# Patient Record
Sex: FEMALE | Race: WHITE | Employment: FULL TIME | URBAN - METROPOLITAN AREA
[De-identification: names, ages, dates, MRNs, and addresses within clinical notes are randomized per-mention and may not be internally consistent; named-entity substitution may affect disease eponyms.]

---

## 2017-06-07 ENCOUNTER — OFFICE VISIT (OUTPATIENT)
Dept: INTERNAL MEDICINE CLINIC | Age: 32
End: 2017-06-07

## 2017-06-07 VITALS
DIASTOLIC BLOOD PRESSURE: 76 MMHG | HEART RATE: 80 BPM | WEIGHT: 249 LBS | RESPIRATION RATE: 16 BRPM | BODY MASS INDEX: 41.48 KG/M2 | SYSTOLIC BLOOD PRESSURE: 122 MMHG | HEIGHT: 65 IN

## 2017-06-07 DIAGNOSIS — F32.A DEPRESSION, UNSPECIFIED DEPRESSION TYPE: ICD-10-CM

## 2017-06-07 DIAGNOSIS — Z00.00 PREVENTATIVE HEALTH CARE: ICD-10-CM

## 2017-06-07 DIAGNOSIS — Z00.00 PREVENTATIVE HEALTH CARE: Primary | ICD-10-CM

## 2017-06-07 LAB
A/G RATIO: 1.2 (ref 1.1–2.2)
ALBUMIN SERPL-MCNC: 3.9 G/DL (ref 3.4–5)
ALP BLD-CCNC: 89 U/L (ref 40–129)
ALT SERPL-CCNC: 24 U/L (ref 10–40)
ANION GAP SERPL CALCULATED.3IONS-SCNC: 14 MMOL/L (ref 3–16)
AST SERPL-CCNC: 18 U/L (ref 15–37)
BILIRUB SERPL-MCNC: 0.4 MG/DL (ref 0–1)
BUN BLDV-MCNC: 15 MG/DL (ref 7–20)
CALCIUM SERPL-MCNC: 8.9 MG/DL (ref 8.3–10.6)
CHLORIDE BLD-SCNC: 102 MMOL/L (ref 99–110)
CHOLESTEROL, TOTAL: 227 MG/DL (ref 0–199)
CO2: 25 MMOL/L (ref 21–32)
CREAT SERPL-MCNC: 0.8 MG/DL (ref 0.6–1.1)
GFR AFRICAN AMERICAN: >60
GFR NON-AFRICAN AMERICAN: >60
GLOBULIN: 3.3 G/DL
GLUCOSE BLD-MCNC: 79 MG/DL (ref 70–99)
HDLC SERPL-MCNC: 54 MG/DL (ref 40–60)
LDL CHOLESTEROL CALCULATED: 144 MG/DL
POTASSIUM SERPL-SCNC: 4.7 MMOL/L (ref 3.5–5.1)
SODIUM BLD-SCNC: 141 MMOL/L (ref 136–145)
TOTAL PROTEIN: 7.2 G/DL (ref 6.4–8.2)
TRIGL SERPL-MCNC: 144 MG/DL (ref 0–150)
VLDLC SERPL CALC-MCNC: 29 MG/DL

## 2017-06-07 PROCEDURE — 99395 PREV VISIT EST AGE 18-39: CPT | Performed by: INTERNAL MEDICINE

## 2017-06-07 RX ORDER — VALACYCLOVIR HYDROCHLORIDE 1 G/1
TABLET, FILM COATED ORAL
Qty: 30 TABLET | Refills: 11 | Status: SHIPPED | OUTPATIENT
Start: 2017-06-07 | End: 2018-06-29 | Stop reason: SDUPTHER

## 2017-06-07 RX ORDER — BUPROPION HYDROCHLORIDE 300 MG/1
TABLET ORAL
Qty: 30 TABLET | Refills: 11 | Status: SHIPPED | OUTPATIENT
Start: 2017-06-07 | End: 2018-06-29 | Stop reason: SDUPTHER

## 2017-06-07 ASSESSMENT — PATIENT HEALTH QUESTIONNAIRE - PHQ9
1. LITTLE INTEREST OR PLEASURE IN DOING THINGS: 0
SUM OF ALL RESPONSES TO PHQ QUESTIONS 1-9: 0
2. FEELING DOWN, DEPRESSED OR HOPELESS: 0
SUM OF ALL RESPONSES TO PHQ9 QUESTIONS 1 & 2: 0

## 2017-09-12 ENCOUNTER — NURSE ONLY (OUTPATIENT)
Dept: INTERNAL MEDICINE CLINIC | Age: 32
End: 2017-09-12

## 2017-09-12 DIAGNOSIS — Z23 FLU VACCINE NEED: Primary | ICD-10-CM

## 2017-09-12 PROCEDURE — 99999 PR OFFICE/OUTPT VISIT,PROCEDURE ONLY: CPT | Performed by: INTERNAL MEDICINE

## 2017-09-12 PROCEDURE — 90686 IIV4 VACC NO PRSV 0.5 ML IM: CPT | Performed by: INTERNAL MEDICINE

## 2017-09-12 PROCEDURE — 90471 IMMUNIZATION ADMIN: CPT | Performed by: INTERNAL MEDICINE

## 2018-06-29 DIAGNOSIS — Z00.00 PREVENTATIVE HEALTH CARE: ICD-10-CM

## 2018-06-29 DIAGNOSIS — F32.A DEPRESSION, UNSPECIFIED DEPRESSION TYPE: ICD-10-CM

## 2018-06-29 RX ORDER — BUPROPION HYDROCHLORIDE 300 MG/1
TABLET ORAL
Qty: 30 TABLET | Refills: 10 | Status: SHIPPED | OUTPATIENT
Start: 2018-06-29 | End: 2018-07-02 | Stop reason: SDUPTHER

## 2018-06-29 RX ORDER — VALACYCLOVIR HYDROCHLORIDE 1 G/1
TABLET, FILM COATED ORAL
Qty: 30 TABLET | Refills: 10 | Status: SHIPPED | OUTPATIENT
Start: 2018-06-29 | End: 2018-07-02 | Stop reason: SDUPTHER

## 2018-07-02 DIAGNOSIS — Z00.00 PREVENTATIVE HEALTH CARE: ICD-10-CM

## 2018-07-02 DIAGNOSIS — F32.A DEPRESSION, UNSPECIFIED DEPRESSION TYPE: ICD-10-CM

## 2018-07-02 RX ORDER — VALACYCLOVIR HYDROCHLORIDE 1 G/1
1000 TABLET, FILM COATED ORAL DAILY
Qty: 30 TABLET | Refills: 11 | Status: SHIPPED | OUTPATIENT
Start: 2018-07-02 | End: 2019-07-22 | Stop reason: SDUPTHER

## 2018-07-02 RX ORDER — BUPROPION HYDROCHLORIDE 300 MG/1
300 TABLET ORAL EVERY MORNING
Qty: 30 TABLET | Refills: 11 | Status: SHIPPED | OUTPATIENT
Start: 2018-07-02 | End: 2019-07-22 | Stop reason: SDUPTHER

## 2018-08-13 ENCOUNTER — OFFICE VISIT (OUTPATIENT)
Dept: INTERNAL MEDICINE CLINIC | Age: 33
End: 2018-08-13

## 2018-08-13 VITALS
SYSTOLIC BLOOD PRESSURE: 118 MMHG | HEIGHT: 65 IN | BODY MASS INDEX: 37.72 KG/M2 | DIASTOLIC BLOOD PRESSURE: 74 MMHG | HEART RATE: 86 BPM | RESPIRATION RATE: 18 BRPM | OXYGEN SATURATION: 97 % | WEIGHT: 226.4 LBS

## 2018-08-13 DIAGNOSIS — B00.1 COLD SORE: ICD-10-CM

## 2018-08-13 DIAGNOSIS — E78.00 HYPERCHOLESTEREMIA: ICD-10-CM

## 2018-08-13 DIAGNOSIS — Z00.00 ENCOUNTER FOR PREVENTIVE CARE: ICD-10-CM

## 2018-08-13 DIAGNOSIS — Z00.00 ENCOUNTER FOR PREVENTIVE CARE: Primary | ICD-10-CM

## 2018-08-13 LAB
A/G RATIO: 1.5 (ref 1.1–2.2)
ALBUMIN SERPL-MCNC: 4.1 G/DL (ref 3.4–5)
ALP BLD-CCNC: 70 U/L (ref 40–129)
ALT SERPL-CCNC: 21 U/L (ref 10–40)
ANION GAP SERPL CALCULATED.3IONS-SCNC: 10 MMOL/L (ref 3–16)
AST SERPL-CCNC: 18 U/L (ref 15–37)
BILIRUB SERPL-MCNC: <0.2 MG/DL (ref 0–1)
BUN BLDV-MCNC: 11 MG/DL (ref 7–20)
CALCIUM SERPL-MCNC: 9 MG/DL (ref 8.3–10.6)
CHLORIDE BLD-SCNC: 102 MMOL/L (ref 99–110)
CHOLESTEROL, TOTAL: 216 MG/DL (ref 0–199)
CO2: 27 MMOL/L (ref 21–32)
CREAT SERPL-MCNC: 0.8 MG/DL (ref 0.6–1.1)
GFR AFRICAN AMERICAN: >60
GFR NON-AFRICAN AMERICAN: >60
GLOBULIN: 2.8 G/DL
GLUCOSE BLD-MCNC: 92 MG/DL (ref 70–99)
HDLC SERPL-MCNC: 57 MG/DL (ref 40–60)
LDL CHOLESTEROL CALCULATED: 146 MG/DL
POTASSIUM SERPL-SCNC: 5 MMOL/L (ref 3.5–5.1)
SODIUM BLD-SCNC: 139 MMOL/L (ref 136–145)
TOTAL PROTEIN: 6.9 G/DL (ref 6.4–8.2)
TRIGL SERPL-MCNC: 65 MG/DL (ref 0–150)
VLDLC SERPL CALC-MCNC: 13 MG/DL

## 2018-08-13 PROCEDURE — 99395 PREV VISIT EST AGE 18-39: CPT | Performed by: INTERNAL MEDICINE

## 2018-08-13 ASSESSMENT — PATIENT HEALTH QUESTIONNAIRE - PHQ9
SUM OF ALL RESPONSES TO PHQ QUESTIONS 1-9: 0
2. FEELING DOWN, DEPRESSED OR HOPELESS: 0
SUM OF ALL RESPONSES TO PHQ QUESTIONS 1-9: 0
1. LITTLE INTEREST OR PLEASURE IN DOING THINGS: 0
SUM OF ALL RESPONSES TO PHQ9 QUESTIONS 1 & 2: 0

## 2019-02-11 RX ORDER — OSELTAMIVIR PHOSPHATE 75 MG/1
75 CAPSULE ORAL DAILY
Qty: 10 CAPSULE | Refills: 0 | Status: SHIPPED | OUTPATIENT
Start: 2019-02-11 | End: 2019-02-21

## 2019-03-20 ENCOUNTER — OFFICE VISIT (OUTPATIENT)
Dept: INTERNAL MEDICINE CLINIC | Age: 34
End: 2019-03-20
Payer: COMMERCIAL

## 2019-03-20 VITALS
HEART RATE: 105 BPM | RESPIRATION RATE: 18 BRPM | SYSTOLIC BLOOD PRESSURE: 116 MMHG | OXYGEN SATURATION: 97 % | DIASTOLIC BLOOD PRESSURE: 70 MMHG

## 2019-03-20 DIAGNOSIS — E66.9 CLASS 2 OBESITY WITHOUT SERIOUS COMORBIDITY WITH BODY MASS INDEX (BMI) OF 37.0 TO 37.9 IN ADULT, UNSPECIFIED OBESITY TYPE: ICD-10-CM

## 2019-03-20 DIAGNOSIS — M25.561 ACUTE PAIN OF RIGHT KNEE: Primary | ICD-10-CM

## 2019-03-20 PROCEDURE — 1036F TOBACCO NON-USER: CPT | Performed by: INTERNAL MEDICINE

## 2019-03-20 PROCEDURE — G8484 FLU IMMUNIZE NO ADMIN: HCPCS | Performed by: INTERNAL MEDICINE

## 2019-03-20 PROCEDURE — G8417 CALC BMI ABV UP PARAM F/U: HCPCS | Performed by: INTERNAL MEDICINE

## 2019-03-20 PROCEDURE — G8427 DOCREV CUR MEDS BY ELIG CLIN: HCPCS | Performed by: INTERNAL MEDICINE

## 2019-03-20 PROCEDURE — 99213 OFFICE O/P EST LOW 20 MIN: CPT | Performed by: INTERNAL MEDICINE

## 2019-03-20 RX ORDER — NAPROXEN 500 MG/1
500 TABLET ORAL 2 TIMES DAILY WITH MEALS
Qty: 30 TABLET | Refills: 0 | Status: SHIPPED | OUTPATIENT
Start: 2019-03-20 | End: 2019-09-27 | Stop reason: ALTCHOICE

## 2019-03-20 ASSESSMENT — PATIENT HEALTH QUESTIONNAIRE - PHQ9
SUM OF ALL RESPONSES TO PHQ QUESTIONS 1-9: 1
SUM OF ALL RESPONSES TO PHQ9 QUESTIONS 1 & 2: 1
1. LITTLE INTEREST OR PLEASURE IN DOING THINGS: 1
SUM OF ALL RESPONSES TO PHQ QUESTIONS 1-9: 1
2. FEELING DOWN, DEPRESSED OR HOPELESS: 0

## 2019-03-27 ENCOUNTER — TELEPHONE (OUTPATIENT)
Dept: BARIATRICS/WEIGHT MGMT | Age: 34
End: 2019-03-27

## 2019-04-01 ENCOUNTER — TELEPHONE (OUTPATIENT)
Dept: INTERNAL MEDICINE CLINIC | Age: 34
End: 2019-04-01

## 2019-04-01 DIAGNOSIS — M25.561 ACUTE PAIN OF RIGHT KNEE: Primary | ICD-10-CM

## 2019-04-08 ENCOUNTER — HOSPITAL ENCOUNTER (OUTPATIENT)
Dept: MRI IMAGING | Age: 34
Discharge: HOME | End: 2019-04-08
Payer: COMMERCIAL

## 2019-04-08 DIAGNOSIS — M25.561 ACUTE PAIN OF RIGHT KNEE: ICD-10-CM

## 2019-04-08 PROCEDURE — 73721 MRI JNT OF LWR EXTRE W/O DYE: CPT

## 2019-04-09 ENCOUNTER — HOSPITAL ENCOUNTER (OUTPATIENT)
Age: 34
Discharge: HOME | End: 2019-04-09
Payer: COMMERCIAL

## 2019-04-09 ENCOUNTER — OFFICE VISIT (OUTPATIENT)
Dept: BARIATRICS/WEIGHT MGMT | Age: 34
End: 2019-04-09
Payer: COMMERCIAL

## 2019-04-09 VITALS
WEIGHT: 228.8 LBS | BODY MASS INDEX: 38.12 KG/M2 | HEART RATE: 73 BPM | SYSTOLIC BLOOD PRESSURE: 126 MMHG | HEIGHT: 65 IN | RESPIRATION RATE: 20 BRPM | DIASTOLIC BLOOD PRESSURE: 68 MMHG

## 2019-04-09 DIAGNOSIS — E66.01 MORBID OBESITY DUE TO EXCESS CALORIES (HCC): ICD-10-CM

## 2019-04-09 DIAGNOSIS — E78.5 HYPERLIPIDEMIA, UNSPECIFIED HYPERLIPIDEMIA TYPE: Primary | ICD-10-CM

## 2019-04-09 LAB
FOLATE: 11.7 NG/ML (ref 3.1–17.5)
INTERPRETATION: NORMAL
PREGNANCY, URINE: NEGATIVE
SPECIFIC GRAVITY, URINE: 1 (ref 1–1.03)
VITAMIN B-12: 578.8 PG/ML (ref 211–911)
VITAMIN D 25-HYDROXY: 29.89 NG/ML

## 2019-04-09 PROCEDURE — 82306 VITAMIN D 25 HYDROXY: CPT

## 2019-04-09 PROCEDURE — 99204 OFFICE O/P NEW MOD 45 MIN: CPT | Performed by: NURSE PRACTITIONER

## 2019-04-09 PROCEDURE — 81025 URINE PREGNANCY TEST: CPT

## 2019-04-09 PROCEDURE — 82746 ASSAY OF FOLIC ACID SERUM: CPT

## 2019-04-09 PROCEDURE — 36415 COLL VENOUS BLD VENIPUNCTURE: CPT

## 2019-04-09 PROCEDURE — G8417 CALC BMI ABV UP PARAM F/U: HCPCS | Performed by: NURSE PRACTITIONER

## 2019-04-09 PROCEDURE — 1036F TOBACCO NON-USER: CPT | Performed by: NURSE PRACTITIONER

## 2019-04-09 PROCEDURE — G8427 DOCREV CUR MEDS BY ELIG CLIN: HCPCS | Performed by: NURSE PRACTITIONER

## 2019-04-09 PROCEDURE — 82607 VITAMIN B-12: CPT

## 2019-04-09 RX ORDER — VALACYCLOVIR HYDROCHLORIDE 1 G/1
1000 TABLET, FILM COATED ORAL
COMMUNITY
End: 2019-04-09

## 2019-04-09 RX ORDER — BUPROPION HYDROCHLORIDE 150 MG/1
150 TABLET, EXTENDED RELEASE ORAL
COMMUNITY
End: 2019-04-09

## 2019-04-09 SDOH — HEALTH STABILITY: MENTAL HEALTH: HOW OFTEN DO YOU HAVE A DRINK CONTAINING ALCOHOL?: MONTHLY OR LESS

## 2019-04-09 NOTE — PROGRESS NOTES
Patient dines out to a sit down restaurant 4 times per month. Patient eats fast food meals 1- 2 times per week. Drinks mostly coffee    24 hour recall/food frequency chart:  Breakfast: A quest protein cookie  Snack: no  Lunch: Homemade jalapeno poppers   Snack: no  Dinner: Salad @ [employer]  Snack: no    Total daily calories: Count carbs not calories tries to stay under 30 per day. Exercises Cardio, weights, soccer practice 30 min 4  Times per week.

## 2019-04-09 NOTE — PROGRESS NOTES
Subjective     Chief Complaint   Patient presents with    New Patient     New Medication Consultation       Vitals:    04/09/19 0945   BP: 126/68   Pulse: 73   Resp: 20     Wt Readings from Last 3 Encounters:   04/09/19 228 lb 12.8 oz (103.8 kg)   08/13/18 226 lb 6.4 oz (102.7 kg)   06/07/17 249 lb (112.9 kg)       Weight Loss Program History:  The patient is a 27 y.o. female being seen regarding our weight loss program.  The patient's PCP is Dr. Marjan Acevedo and presents on PCP referral.  The patient first recognized that she had a weight problem about 15 years ago. The patient's lowest weight in the last five years was 200 lbs, and the highest weight in the last five years was 255 lbs. Ninoska's current Body mass index is 38.07 kg/m². (4/9/19). HPI:  The patient states she has tried medications and low carb. These attempts have been short term successful with weight loss, but have failed to sustain adequate weight loss. Patient was on adipex around 5 years ago, did well but gained weight back. She also had some tachycardia. Has also been on contrave, cannot remember if tolerated. Also attempted Rolando,did not tolerate. Hx of depression, decently controlled on Wellbutrin seasonal worse. No hx of SI. No hx of drug use or alcohol use. No hx of tachycardia other than on Adipex. Hyperlipidemia, not on medication. Borderline. Has been doing low carb in attempts to lose weight. Ninoska's life is affected by weight related to below comorbidity. The patient has also tried self directed diet and exercise in attempts to sustain weight loss. Comorbid Conditions:  Significant diseases effecting this patient are   Past Medical History:   Diagnosis Date    CCC (chronic calculus cholecystitis) 1/31/2012    Depression     Hyperlipidemia     Recurrent cold sores     On Valtrex One Gram Daily For This   .   Thoroughly reviewed the patient's medical history, family history, social history and review of systems with the patient today in the office. Please see medical record for pertinent positives. Allergies:  No Known Allergies    Past Surgical History:  Past Surgical History:   Procedure Laterality Date    CHOLECYSTECTOMY, LAPAROSCOPIC  01/31/2012    WISDOM TOOTH EXTRACTION  2002    \"4\"       Family History:  Family History   Problem Relation Age of Onset    Cancer Mother         \"Multiple Myleoma\"    Arthritis Mother     Depression Mother     High Cholesterol Mother    Marinette Calli / [place] Mother         X1    Stroke Mother         \"TIA'S\"    Mental Illness Mother         \"Dementia\"    Other Mother         \"Thyroid\", \"Glaucoma\"    Mental Illness Father         \"Dementia\"    Heart Disease Father         \"Open Heart For Valves\"    High Cholesterol Father     Other Father         \"Multiple Sclerosis\", \"Thyroid\", \"Glaucoma\"       Social History:  Social History     Socioeconomic History    Marital status:      Spouse name: Not on file    Number of children: 1    Years of education: bachelors degree     Highest education level: Not on file   Occupational History    Occupation:     Social Needs    Financial resource strain: Not on file    Food insecurity:     Worry: Not on file     Inability: Not on file    Transportation needs:     Medical: Not on file     Non-medical: Not on file   Tobacco Use    Smoking status: Never Smoker    Smokeless tobacco: Never Used   Substance and Sexual Activity    Alcohol use:  Yes     Alcohol/week: 0.6 oz     Types: 1 Standard drinks or equivalent per week     Frequency: Monthly or less     Comment: \"Socially\"    Drug use: No    Sexual activity: Yes     Partners: Male     Comment:  had vasectomy   Lifestyle    Physical activity:     Days per week: Not on file     Minutes per session: Not on file    Stress: Not on file   Relationships    Social connections:     Talks on phone: Not on file     Gets together: Not on file Attends Muslim service: Not on file     Active member of club or organization: Not on file     Attends meetings of clubs or organizations: Not on file     Relationship status: Not on file    Intimate partner violence:     Fear of current or ex partner: Not on file     Emotionally abused: Not on file     Physically abused: Not on file     Forced sexual activity: Not on file   Other Topics Concern    Not on file   Social History Narrative        Exercise - beach body programs - high intensity interval training 3 times weekly    Primary caregiver for parents    Seat belts - always       Review of Systems - History obtained from the patient. Review of Systems - General ROS: negative for - chills, fever, hot flashes or night sweats  ENT ROS: negative for - headaches, oral lesions, sore throat, vertigo or visual changes  Allergy and Immunology ROS: negative for - hives or nasal congestion  Endocrine ROS: negative for - hair pattern changes, mood swings or polydipsia/polyuria  Respiratory ROS: no cough, shortness of breath, or wheezing  Cardiovascular ROS: no chest pain or dyspnea on exertion  Gastrointestinal ROS: no abdominal pain, change in bowel habits, or black or bloody stools  Genito-Urinary ROS: no dysuria, incontinence, trouble voiding, or hematuria  Musculoskeletal ROS: Negative for joint pain or myalgia  Neurological ROS: negative for - behavioral changes, dizziness, headaches, impaired coordination/balance, numbness/tingling or seizures  Dermatological ROS: negative for - eczema or nail changes  Psychological ROS: negative for anxiety, +depression but denies any hx of suicidal ideation     Objective     Physical Exam   Constitutional: Oriented to person, place, and time and well-developed, well-nourished, and in no distress. No distress. Obese   HENT:   Head: Normocephalic and atraumatic. Eyes: Conjunctivae are normal. Pupils are equal, round, and reactive to light.    Neck: Normal range of motion. Neck supple. Cardiovascular: Normal rate, regular rhythm, normal heart sounds and intact distal pulses. No murmur heard. Pulmonary/Chest: Effort normal and breath sounds normal. No respiratory distress. Abdominal: Soft. Bowel sounds are normal. Exhibits no distension. There is no tenderness. Musculoskeletal: Exhibits no edema or tenderness. Lymphadenopathy: Deferred. Neurological: She is alert and oriented to person, place, and time. No cranial nerve deficit. Skin: Skin is warm. She is not diaphoretic. Psychiatric: Mood, memory, affect and judgment normal.     Assessment  and Plan      Adan Butts is a good candidate for Saxenda/Liraglutide weight loss medication, along with nutrition consult and detailed diet plan. Patient here today to discuss candidacy for weight loss medication: BMI of >30 with co-morbidities. Patient has failed to lose 5% of body weight for 15 years. Dose: Initial 0.6 mg SUBQ. Increase weekly 1.2, 1.8, and 2.4 mg until recommend dose of 3 mg daily. Re-evaluate after 16 weeks. Discussed side effects in detail, most common but not limited to; diarrhea, constipation, hypoglycemia (most common if used in conjunction with DM medications), increase lipase, increase heart rate. Aware to call with ANY side effects. Will meet monthly with registered dietician for weigh in's and education on specific weight loss diet plan. Patient was educated today on increasing protein to >60 grams per day, hydration to over 60 ounces per day, and counting calories (0355-0289) per day. Will monitor weight and vital signs bi-weekly. Patient informed of importance and compliance with diet plan. initial labs and urine pregnancy if applicable. Patient also needs current birth control measures  vasectomy.     Weight GAIN medications to consider; MAOIs tricyclic antidepressants: A lot of antipsychotics, prednisone, hormonal agents especially progestins and medroxyprogesterone. Wellbutrin good options for depression, weight loss. Do not recommend adipex given hx of tachycardia, no contrave or belviq, given Wellbutrin use. Plan    1. Hyperlipidemia, unspecified hyperlipidemia type  - Patient has been working on weight loss, will recheck labs and needs urine preg. - Discussed higher lean protein foods and carb. - PREGNANCY, URINE; Future  - Vitamin B12 & Folate; Future  - Vitamin D 25 Hydroxy; Future  - Liraglutide (VICTOZA) 18 MG/3ML SOPN SC injection; Inject 0.6 mg into the skin daily 0.6 x1 wk, 1.2 wk 2, 1.8 wk 3, 2.4 wk 4, 3 mg wk 5 on. BMI 38. Dispense: 2 pen; Refill: 3    2. Morbid obesity due to excess calories [facility])  - Patient here for weight loss consult. - Discussed working towards BMI <30.   - Discussed medication and watched video tutorial.   - Liraglutide (VICTOZA) 18 MG/3ML SOPN SC injection; Inject 0.6 mg into the skin daily 0.6 x1 wk, 1.2 wk 2, 1.8 wk 3, 2.4 wk 4, 3 mg wk 5 on. BMI 38. Dispense: 2 pen; Refill: 3    - Discussed weight loss program with patient and the metabolic components of obesity and insulin resistance over time. - Ultimately will need to change overall eating behaviors to have success in continued management with weight loss. - Will continue to journal food items and discussed the below recommendations to patient. - Encouraged her to weigh daily and work towards a goal of 1-2 pounds weekly. - She is aware that she may schedule at anytime with dietitian or with NP.  - All questions answered and overall appears very receptive.   - Weekly weight and vital checks when starting medication.   - Follow up for new patient group class and RTC 1 dejuan with NP. Patient was encouraged to journal all food intake using [employer] pal. Water drinking was encouraged with a goal of 64oz-128oz daily. Beverages to be calorie free except for milk. Every other beverage should be water. They are to avoid soda.  Continue to increase level of physical activity. I spent 45 minutes with patient today and more than 50% of the office visit today was spent in face to face counseling regarding diet and exercise, counting calories, medication video, complying with the diet recommendations, and complying with the work up of dietary counseling and exercise physiologist counseling. The patient expressed understanding and willingness to comply nicely; all questions and concerns addressed in details. Patient counseled on the benefits of treatment plan at length while in the office today. Patient states an understanding and willingness to proceed with the recommended weight loss plan. Orders Placed This Encounter   Medications    Liraglutide (VICTOZA) 18 MG/3ML SOPN SC injection     Sig: Inject 0.6 mg into the skin daily 0.6 x1 wk, 1.2 wk 2, 1.8 wk 3, 2.4 wk 4, 3 mg wk 5 on. BMI 38. Dispense:  2 pen     Refill:  3     Orders Placed This Encounter   Procedures    PREGNANCY, URINE     Standing Status:   Future     Standing Expiration Date:   4/9/2020    Vitamin B12 & Folate     Standing Status:   Future     Standing Expiration Date:   4/8/2020    Vitamin D 25 Hydroxy     Standing Status:   Future     Standing Expiration Date:   4/9/2020       Follow Up:  No follow-ups on file.     Morena Gomes, CNP

## 2019-04-17 ENCOUNTER — OFFICE VISIT (OUTPATIENT)
Dept: BARIATRICS/WEIGHT MGMT | Age: 34
End: 2019-04-17

## 2019-04-17 VITALS
SYSTOLIC BLOOD PRESSURE: 114 MMHG | HEIGHT: 65 IN | DIASTOLIC BLOOD PRESSURE: 65 MMHG | WEIGHT: 229 LBS | BODY MASS INDEX: 38.15 KG/M2 | HEART RATE: 77 BPM

## 2019-04-17 DIAGNOSIS — E66.9 OBESITY (BMI 30-39.9): Primary | ICD-10-CM

## 2019-04-17 PROCEDURE — 99999 PR OFFICE/OUTPT VISIT,PROCEDURE ONLY: CPT

## 2019-04-17 NOTE — PROGRESS NOTES
children: 1    Years of education: bachelors degree     Highest education level: Not on file   Occupational History    Occupation:     Social Needs    Financial resource strain: Not on file    Food insecurity:     Worry: Not on file     Inability: Not on file    Transportation needs:     Medical: Not on file     Non-medical: Not on file   Tobacco Use    Smoking status: Never Smoker    Smokeless tobacco: Never Used   Substance and Sexual Activity    Alcohol use:  Yes     Alcohol/week: 0.6 oz     Types: 1 Standard drinks or equivalent per week     Frequency: Monthly or less     Comment: \"Socially\"    Drug use: No    Sexual activity: Yes     Partners: Male     Comment:  had vasectomy   Lifestyle    Physical activity:     Days per week: Not on file     Minutes per session: Not on file    Stress: Not on file   Relationships    Social connections:     Talks on phone: Not on file     Gets together: Not on file     Attends Rastafarian service: Not on file     Active member of club or organization: Not on file     Attends meetings of clubs or organizations: Not on file     Relationship status: Not on file    Intimate partner violence:     Fear of current or ex partner: Not on file     Emotionally abused: Not on file     Physically abused: Not on file     Forced sexual activity: Not on file   Other Topics Concern    Not on file   Social History Narrative        Exercise - beach body programs - high intensity interval training 3 times weekly    Primary caregiver for parents    Seat belts - always         OBJECTIVE:  Physical Exam   /65 (Site: Right Upper Arm, Position: Sitting, Cuff Size: Large Adult)   Pulse 77   Ht 5' 5\" (1.651 m)   Wt 229 lb (103.9 kg)   LMP 04/07/2019 (Exact Date)   BMI 38.11 kg/m²        NUTRITION DIAGNOSIS: Overweight / Obesity   Problem: Increased adiposity compared to reference standard orestablished norm   Etiology: Excess intake compared to output over time   S/S: Ht: 65\" Wt: 229 lbs BMI: 38.11    NUTRITION INTERVENTIONS:    Individualized treatment goals to address nutrition diagnosis:   Instructed on Medications and diet for weight loss   Provided handouts   Encouraged Physical activity as approved by physician    MONITORING/ EVALUATION/ PLAN:   Pt verbalized understanding of all materials covered   Pt asked pertinent questions throughout the session - expectcompliance with nutrition guidelines presented   Provided pt with contact information should questions arise prior to next visit   Will f/u with pt weekly  Joon Stewart MS, RDN, LD  4/17/2019

## 2019-04-18 ENCOUNTER — HOSPITAL ENCOUNTER (OUTPATIENT)
Age: 34
Discharge: HOME | End: 2019-04-18
Payer: COMMERCIAL

## 2019-04-18 ENCOUNTER — HOSPITAL ENCOUNTER (OUTPATIENT)
Age: 34
Setting detail: SPECIMEN
Discharge: HOME | End: 2019-04-18
Payer: COMMERCIAL

## 2019-04-18 ENCOUNTER — TELEPHONE (OUTPATIENT)
Dept: BARIATRICS/WEIGHT MGMT | Age: 34
End: 2019-04-18

## 2019-04-18 DIAGNOSIS — E66.01 MORBID OBESITY DUE TO EXCESS CALORIES (HCC): Primary | ICD-10-CM

## 2019-04-18 LAB
AMPHETAMINES: NEGATIVE
BARBITURATE SCREEN URINE: NEGATIVE
BENZODIAZEPINE SCREEN, URINE: NEGATIVE
CANNABINOID SCREEN URINE: NEGATIVE
COCAINE METABOLITE: NEGATIVE
OPIATES, URINE: NEGATIVE
OXYCODONE: NORMAL
PHENCYCLIDINE, URINE: NEGATIVE

## 2019-04-18 PROCEDURE — 93005 ELECTROCARDIOGRAM TRACING: CPT | Performed by: NURSE PRACTITIONER

## 2019-04-18 PROCEDURE — 80307 DRUG TEST PRSMV CHEM ANLYZR: CPT

## 2019-04-19 LAB
EKG ATRIAL RATE: 66 BPM
EKG DIAGNOSIS: NORMAL
EKG P AXIS: 36 DEGREES
EKG P-R INTERVAL: 164 MS
EKG Q-T INTERVAL: 404 MS
EKG QRS DURATION: 80 MS
EKG QTC CALCULATION (BAZETT): 423 MS
EKG R AXIS: 25 DEGREES
EKG T AXIS: 26 DEGREES
EKG VENTRICULAR RATE: 66 BPM

## 2019-04-19 PROCEDURE — 93010 ELECTROCARDIOGRAM REPORT: CPT | Performed by: INTERNAL MEDICINE

## 2019-04-22 ENCOUNTER — OFFICE VISIT (OUTPATIENT)
Dept: BARIATRICS/WEIGHT MGMT | Age: 34
End: 2019-04-22
Payer: COMMERCIAL

## 2019-04-22 VITALS
RESPIRATION RATE: 16 BRPM | HEART RATE: 75 BPM | SYSTOLIC BLOOD PRESSURE: 116 MMHG | BODY MASS INDEX: 38.49 KG/M2 | WEIGHT: 231 LBS | HEIGHT: 65 IN | DIASTOLIC BLOOD PRESSURE: 78 MMHG

## 2019-04-22 DIAGNOSIS — E66.01 MORBID OBESITY DUE TO EXCESS CALORIES (HCC): Primary | ICD-10-CM

## 2019-04-22 PROCEDURE — G8417 CALC BMI ABV UP PARAM F/U: HCPCS | Performed by: NURSE PRACTITIONER

## 2019-04-22 PROCEDURE — 1036F TOBACCO NON-USER: CPT | Performed by: NURSE PRACTITIONER

## 2019-04-22 PROCEDURE — G8427 DOCREV CUR MEDS BY ELIG CLIN: HCPCS | Performed by: NURSE PRACTITIONER

## 2019-04-22 PROCEDURE — 99213 OFFICE O/P EST LOW 20 MIN: CPT | Performed by: NURSE PRACTITIONER

## 2019-04-22 RX ORDER — PHENTERMINE HYDROCHLORIDE 37.5 MG/1
37.5 TABLET ORAL
Qty: 30 TABLET | Refills: 0 | Status: SHIPPED | OUTPATIENT
Start: 2019-04-22 | End: 2019-05-24 | Stop reason: SDUPTHER

## 2019-04-22 ASSESSMENT — ENCOUNTER SYMPTOMS
CHEST TIGHTNESS: 0
BACK PAIN: 1
RHINORRHEA: 0
NAUSEA: 0
DIARRHEA: 0
EYE PAIN: 0
SHORTNESS OF BREATH: 0
ABDOMINAL DISTENTION: 0
ABDOMINAL PAIN: 0
TROUBLE SWALLOWING: 0
WHEEZING: 0

## 2019-05-01 ENCOUNTER — OFFICE VISIT (OUTPATIENT)
Dept: BARIATRICS/WEIGHT MGMT | Age: 34
End: 2019-05-01

## 2019-05-01 VITALS
HEART RATE: 92 BPM | WEIGHT: 225.4 LBS | DIASTOLIC BLOOD PRESSURE: 64 MMHG | SYSTOLIC BLOOD PRESSURE: 126 MMHG | HEIGHT: 65 IN | BODY MASS INDEX: 37.55 KG/M2

## 2019-05-01 DIAGNOSIS — E66.9 OBESITY (BMI 30-39.9): Primary | ICD-10-CM

## 2019-05-01 PROCEDURE — 99999 PR OFFICE/OUTPT VISIT,PROCEDURE ONLY: CPT

## 2019-05-01 NOTE — PROGRESS NOTES
(Exact Date)   BMI 37.51 kg/m²        NUTRITION DIAGNOSIS: Overweight / Obesity   Problem: Increased adiposity compared to reference standard orestablished norm   Etiology: Excess intake compared to output over time   S/S: Ht: 65\" Wt: 225.4 lbs BMI: 37.51    NUTRITION INTERVENTIONS:    Individualized treatment goals to address nutrition diagnosis:   Instructed on 55-65 gm Net Carb diet   Provided sample menus, and meal planning handouts   Encouraged Physical activity as approved by physician    MONITORING/ EVALUATION/ PLAN:   Pt verbalized understanding of all materials covered   Pt asked pertinent questions throughout the session - expectcompliance with nutrition guidelines presented   Provided pt with contact information should questions arise prior to next visit   Will f/u with pt monthly  Loren Way MS, RDN, LD  5/1/2019

## 2019-05-10 ENCOUNTER — NURSE ONLY (OUTPATIENT)
Dept: BARIATRICS/WEIGHT MGMT | Age: 34
End: 2019-05-10

## 2019-05-10 VITALS
HEART RATE: 86 BPM | HEIGHT: 65 IN | SYSTOLIC BLOOD PRESSURE: 118 MMHG | BODY MASS INDEX: 37.17 KG/M2 | DIASTOLIC BLOOD PRESSURE: 76 MMHG | RESPIRATION RATE: 16 BRPM | WEIGHT: 223.1 LBS

## 2019-05-10 DIAGNOSIS — E66.9 OBESITY (BMI 30-39.9): Primary | ICD-10-CM

## 2019-05-15 ENCOUNTER — NURSE ONLY (OUTPATIENT)
Dept: BARIATRICS/WEIGHT MGMT | Age: 34
End: 2019-05-15

## 2019-05-15 VITALS
HEIGHT: 65 IN | SYSTOLIC BLOOD PRESSURE: 124 MMHG | BODY MASS INDEX: 36.84 KG/M2 | DIASTOLIC BLOOD PRESSURE: 66 MMHG | WEIGHT: 221.1 LBS | HEART RATE: 74 BPM

## 2019-05-15 DIAGNOSIS — E66.01 MORBID OBESITY DUE TO EXCESS CALORIES (HCC): Primary | ICD-10-CM

## 2019-05-24 ENCOUNTER — OFFICE VISIT (OUTPATIENT)
Dept: BARIATRICS/WEIGHT MGMT | Age: 34
End: 2019-05-24
Payer: COMMERCIAL

## 2019-05-24 VITALS
SYSTOLIC BLOOD PRESSURE: 118 MMHG | RESPIRATION RATE: 16 BRPM | BODY MASS INDEX: 36.59 KG/M2 | HEIGHT: 65 IN | WEIGHT: 219.6 LBS | HEART RATE: 84 BPM | DIASTOLIC BLOOD PRESSURE: 70 MMHG

## 2019-05-24 DIAGNOSIS — E78.00 HYPERCHOLESTEREMIA: Primary | ICD-10-CM

## 2019-05-24 DIAGNOSIS — E66.01 MORBID OBESITY DUE TO EXCESS CALORIES (HCC): ICD-10-CM

## 2019-05-24 PROCEDURE — 1036F TOBACCO NON-USER: CPT | Performed by: NURSE PRACTITIONER

## 2019-05-24 PROCEDURE — G8417 CALC BMI ABV UP PARAM F/U: HCPCS | Performed by: NURSE PRACTITIONER

## 2019-05-24 PROCEDURE — G8427 DOCREV CUR MEDS BY ELIG CLIN: HCPCS | Performed by: NURSE PRACTITIONER

## 2019-05-24 PROCEDURE — 99213 OFFICE O/P EST LOW 20 MIN: CPT | Performed by: NURSE PRACTITIONER

## 2019-05-24 RX ORDER — PHENTERMINE HYDROCHLORIDE 37.5 MG/1
37.5 TABLET ORAL
Qty: 30 TABLET | Refills: 0 | Status: SHIPPED | OUTPATIENT
Start: 2019-05-24 | End: 2019-06-20 | Stop reason: ALTCHOICE

## 2019-05-24 ASSESSMENT — ENCOUNTER SYMPTOMS
EYE PAIN: 0
ABDOMINAL DISTENTION: 0
RHINORRHEA: 0
NAUSEA: 0
TROUBLE SWALLOWING: 0
ABDOMINAL PAIN: 0
CHEST TIGHTNESS: 0
WHEEZING: 0
DIARRHEA: 0
SHORTNESS OF BREATH: 0

## 2019-05-24 NOTE — PROGRESS NOTES
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:    Chief Complaint   Patient presents with    Weight Management     Adipex rx #2      Past Medical History:   Diagnosis Date    CCC (chronic calculus cholecystitis) 1/31/2012    Depression     Hyperlipidemia     Recurrent cold sores     On Valtrex One Gram Daily For This     Past Surgical History:   Procedure Laterality Date    CHOLECYSTECTOMY, LAPAROSCOPIC  01/31/2012    WISDOM TOOTH EXTRACTION  2002    \"4\"     Current Outpatient Medications   Medication Sig Dispense Refill    phentermine (ADIPEX-P) 37.5 MG tablet Take 1 tablet by mouth every morning (before breakfast) for 30 days. BMI 36 30 tablet 0    naproxen (NAPROSYN) 500 MG tablet Take 1 tablet by mouth 2 times daily (with meals) 30 tablet 0    buPROPion (WELLBUTRIN XL) 300 MG extended release tablet Take 1 tablet by mouth every morning 30 tablet 11    valACYclovir (VALTREX) 1 g tablet Take 1 tablet by mouth daily 30 tablet 11    Norethin Ace-Eth Estrad-FE 1-20 MG-MCG(24) TABS Take by mouth daily       No current facility-administered medications for this visit. Family History   Problem Relation Age of Onset   Mario Alberto Do Cancer Mother         \"Multiple Myleoma\"    Arthritis Mother     Depression Mother     High Cholesterol Mother    Ajay Subramanian / Viktoriya Garrido Mother         X1    Stroke Mother         \"TIA'S\"    Mental Illness Mother         \"Dementia\"    Other Mother         \"Thyroid\", \"Glaucoma\"    Mental Illness Father         \"Dementia\"    Heart Disease Father         \"Open Heart For Valves\"    High Cholesterol Father     Other Father         \"Multiple Sclerosis\", \"Thyroid\", \"Glaucoma\"       HPI  Khris Ferguson presents today for weight loss visit. Has been on adipex x1 month, down -11.4 lbs. No ill side effects reported or mood changes. All weekly nurse vital checks wnl. Meeting with RD monthly. Patient has been calorie counting, staying around 1400 calories per day.  Not going over 50 grams of carbs. Instructed on 55-65 gm Net Carb. Replacing with protein shake/bar daily. Hydration, is adequate but working on it. No calories from drinks. Only doing 1 coffee per day. Patient attempting yard work, no current exercise.  vasectomy. Review of Systems   Constitutional: Negative. Negative for appetite change, fatigue and fever. HENT: Negative for congestion, dental problem, hearing loss, rhinorrhea and trouble swallowing. Eyes: Negative for pain. Respiratory: Negative for chest tightness, shortness of breath and wheezing. Cardiovascular: Negative for chest pain, palpitations and leg swelling. Gastrointestinal: Negative for abdominal distention, abdominal pain, diarrhea and nausea. Endocrine: Negative for cold intolerance and polydipsia. Genitourinary: Negative for difficulty urinating and frequency. Musculoskeletal: Positive for arthralgias. Negative for gait problem. Skin: Negative for rash. Allergic/Immunologic: Negative for environmental allergies. Neurological: Negative for dizziness, seizures and syncope. Hematological: Does not bruise/bleed easily. Psychiatric/Behavioral: Negative for behavioral problems and suicidal ideas. OBJECTIVE:     /70 (Site: Left Upper Arm, Position: Sitting, Cuff Size: Medium Adult)   Pulse 84   Resp 16   Ht 5' 5\" (1.651 m)   Wt 219 lb 9.6 oz (99.6 kg)   LMP 05/07/2019   BMI 36.54 kg/m²   Wt Readings from Last 3 Encounters:   05/24/19 219 lb 9.6 oz (99.6 kg)   05/15/19 221 lb 1.6 oz (100.3 kg)   05/10/19 223 lb 1.6 oz (101.2 kg)       Physical Exam   Constitutional: She is oriented to person, place, and time. She appears well-developed and well-nourished. Obese   HENT:   Head: Normocephalic and atraumatic.    Right Ear: Hearing and ear canal normal.   Left Ear: Hearing and ear canal normal.   Nose: Nose normal.   Mouth/Throat: Uvula is midline and oropharynx is clear and moist.   Eyes: Pupils are equal, round, and reactive to light. Conjunctivae are normal.   Neck: Normal range of motion. Cardiovascular: Normal rate, regular rhythm and normal heart sounds. Pulmonary/Chest: Effort normal and breath sounds normal. She has no decreased breath sounds. She has no wheezes. She has no rhonchi. She has no rales. Abdominal: Soft. Bowel sounds are normal. There is no tenderness. Musculoskeletal: Normal range of motion. She exhibits no tenderness. In all 4 extremities. Neurological: She is alert and oriented to person, place, and time. She has normal strength. She exhibits normal muscle tone. GCS eye subscore is 4. GCS verbal subscore is 5. GCS motor subscore is 6. Skin: Skin is warm and dry. No rash noted. Psychiatric: She has a normal mood and affect. Her behavior is normal. Judgment normal.   Nursing note and vitals reviewed. ASSESSMENT/ PLAN:    1. Morbid obesity due to excess calories ([facility] [address]  - Presents today for weight loss visit, 2nd month for adipex. Weight loss -11.4 total on adipex. - No ill side effects, weekly vital visits normal.   - Patient doing mvi and ca/vit d.   - phentermine (ADIPEX-P) 37.5 MG tablet; Take 1 tablet by mouth every morning (before breakfast) for 30 days. BMI 36  Dispense: 30 tablet; Refill: 0  - Adipex sent and discussed in detail with patient; aware of possible side effects  - AVS explained and given to patient today  - All questions answered and patient verbalizes understanding, no further questions or concerns    2. Hypercholesteremia  - Continue working on weight loss. - lose 5-10 %. No orders of the defined types were placed in this encounter. Return in about 1 month (around 6/24/2019).     Dahlia Fraser, CNP

## 2019-06-20 ENCOUNTER — OFFICE VISIT (OUTPATIENT)
Dept: BARIATRICS/WEIGHT MGMT | Age: 34
End: 2019-06-20
Payer: COMMERCIAL

## 2019-06-20 VITALS
DIASTOLIC BLOOD PRESSURE: 82 MMHG | HEART RATE: 89 BPM | RESPIRATION RATE: 16 BRPM | BODY MASS INDEX: 36.37 KG/M2 | WEIGHT: 218.3 LBS | HEIGHT: 65 IN | SYSTOLIC BLOOD PRESSURE: 128 MMHG

## 2019-06-20 DIAGNOSIS — E66.01 MORBID OBESITY DUE TO EXCESS CALORIES (HCC): ICD-10-CM

## 2019-06-20 DIAGNOSIS — E78.00 HYPERCHOLESTEREMIA: Primary | ICD-10-CM

## 2019-06-20 PROCEDURE — 1036F TOBACCO NON-USER: CPT | Performed by: NURSE PRACTITIONER

## 2019-06-20 PROCEDURE — G8417 CALC BMI ABV UP PARAM F/U: HCPCS | Performed by: NURSE PRACTITIONER

## 2019-06-20 PROCEDURE — 99213 OFFICE O/P EST LOW 20 MIN: CPT | Performed by: NURSE PRACTITIONER

## 2019-06-20 PROCEDURE — G8427 DOCREV CUR MEDS BY ELIG CLIN: HCPCS | Performed by: NURSE PRACTITIONER

## 2019-06-20 RX ORDER — PHENTERMINE HYDROCHLORIDE 37.5 MG/1
37.5 TABLET ORAL
Qty: 30 TABLET | Refills: 0 | Status: SHIPPED | OUTPATIENT
Start: 2019-06-20 | End: 2019-07-20

## 2019-06-20 ASSESSMENT — ENCOUNTER SYMPTOMS
TROUBLE SWALLOWING: 0
RHINORRHEA: 0
DIARRHEA: 0
ABDOMINAL PAIN: 0
WHEEZING: 0
EYE PAIN: 0
BACK PAIN: 1
ABDOMINAL DISTENTION: 0
CHEST TIGHTNESS: 0
NAUSEA: 0
SHORTNESS OF BREATH: 0

## 2019-06-20 NOTE — PROGRESS NOTES
William Santamaria  1985   27 y.o. SUBJECT BRITNEY:    Chief Complaint   Patient presents with    Weight Management     Adipex refill#3, reports doing well. Past Medical History:   Diagnosis Date    CCC (chronic calculus cholecystitis) 1/31/2012    Depression     Hyperlipidemia     Recurrent cold sores     On Valtrex One Gram Daily For This     Past Surgical History:   Procedure Laterality Date    CHOLECYSTECTOMY, LAPAROSCOPIC  01/31/2012    WISDOM TOOTH EXTRACTION  2002    \"4\"     Current Outpatient Medications   Medication Sig Dispense Refill    phentermine (ADIPEX-P) 37.5 MG tablet Take 1 tablet by mouth every morning (before breakfast) for 30 days. BMI 36. 30 tablet 0    naproxen (NAPROSYN) 500 MG tablet Take 1 tablet by mouth 2 times daily (with meals) 30 tablet 0    valACYclovir (VALTREX) 1 g tablet Take 1 tablet by mouth daily 30 tablet 11    Norethin Ace-Eth Estrad-FE 1-20 MG-MCG(24) TABS Take by mouth daily      buPROPion (WELLBUTRIN XL) 300 MG extended release tablet Take 1 tablet by mouth every morning 30 tablet 11     No current facility-administered medications for this visit. Family History   Problem Relation Age of Onset   [facility] Cancer Mother         \"Multiple Myleoma\"    Arthritis Mother     Depression Mother     High Cholesterol Mother    Kaleta Pickler / [place] Mother         X1    Stroke Mother         \"TIA'S\"    Mental Illness Mother         \"Dementia\"    Other Mother         \"Thyroid\", \"Glaucoma\"    Mental Illness Father         \"Dementia\"    Heart Disease Father         \"Open Heart For Valves\"    High Cholesterol Father     Other Father         \"Multiple Sclerosis\", \"Thyroid\", \"Glaucoma\"       HPI  William Santamaria presents today for medication management visit. Patient currently on Adipex x2 months and down -12.7 lbs total. Exercise has been daily and also has been lifting around the house. Protein shake in the mornings every day (15 grams).  Afternoon doing 3 meals per day and calories around 1400. Patient denies and ill side effects- no worsening in anxiety, depression or mood changes. Remains on OCP. Review of Systems   Constitutional: Negative. Negative for appetite change, fatigue and fever. HENT: Negative for congestion, dental problem, hearing loss, rhinorrhea and trouble swallowing. Eyes: Negative for pain. Respiratory: Negative for chest tightness, shortness of breath and wheezing. Cardiovascular: Negative for chest pain, palpitations and leg swelling. Gastrointestinal: Negative for abdominal distention, abdominal pain, diarrhea and nausea. Endocrine: Negative for cold intolerance and polydipsia. Genitourinary: Negative for difficulty urinating and frequency. Musculoskeletal: Positive for arthralgias and back pain. Negative for gait problem. Skin: Negative for rash. Allergic/Immunologic: Negative for environmental allergies. Neurological: Negative for dizziness, seizures and syncope. Hematological: Does not bruise/bleed easily. Psychiatric/Behavioral: Negative for behavioral problems and suicidal ideas. OBJECTIVE:     /82 (Site: Right Upper Arm, Position: Sitting, Cuff Size: Large Adult)   Pulse 89   Resp 16   Ht 5' 5\" (1.651 m)   Wt 218 lb 4.8 oz (99 kg)   LMP 06/03/2019   BMI 36.33 kg/m²   Wt Readings from Last 3 Encounters:   06/20/19 218 lb 4.8 oz (99 kg)   05/24/19 219 lb 9.6 oz (99.6 kg)   05/15/19 221 lb 1.6 oz (100.3 kg)       Physical Exam   Constitutional: She is oriented to person, place, and time. She appears well-developed and well-nourished. Obese   HENT:   Head: Normocephalic and atraumatic. Right Ear: Hearing and ear canal normal.   Left Ear: Hearing and ear canal normal.   Nose: Nose normal.   Mouth/Throat: Uvula is midline and oropharynx is clear and moist.   Eyes: Pupils are equal, round, and reactive to light. Conjunctivae are normal.   Neck: Normal range of motion.    Cardiovascular: Normal rate, regular rhythm and normal heart sounds. Pulmonary/Chest: Effort normal and breath sounds normal. She has no decreased breath sounds. She has no wheezes. She has no rhonchi. She has no rales. Abdominal: Soft. Bowel sounds are normal. There is no tenderness. Musculoskeletal: Normal range of motion. She exhibits no tenderness. In all 4 extremities. Neurological: She is alert and oriented to person, place, and time. She has normal strength. She exhibits normal muscle tone. GCS eye subscore is 4. GCS verbal subscore is 5. GCS motor subscore is 6. Skin: Skin is warm and dry. No rash noted. Psychiatric: She has a normal mood and affect. Her behavior is normal. Judgment normal.   Nursing note and vitals reviewed. ASSESSMENT/ PLAN:    1. Hypercholesteremia  - Stable, will monitor with weight loss. - Continue working on weight loss. 2. Morbid obesity due to excess calories ([address]  - Patient reports more muscle building exercises, weight loss has slowed the last month but down -12 lbs total.   - Patient instructed to drop calories to 1200 daily, discussed higher protein foods and high volume-lower calorie foods such as beans, broccoli, etc.   - Patient reports no ill side effects and aware to call immediately. AVSS. - phentermine (ADIPEX-P) 37.5 MG tablet; Take 1 tablet by mouth every morning (before breakfast) for 30 days. BMI 36. Dispense: 30 tablet; Refill: 0.  - RTC 1 month with NP. No orders of the defined types were placed in this encounter. Return in about 1 month (around 7/20/2019).     Laury Duncan, CNP

## 2019-07-22 ENCOUNTER — OFFICE VISIT (OUTPATIENT)
Dept: BARIATRICS/WEIGHT MGMT | Age: 34
End: 2019-07-22
Payer: COMMERCIAL

## 2019-07-22 VITALS
RESPIRATION RATE: 18 BRPM | HEART RATE: 68 BPM | WEIGHT: 216.4 LBS | BODY MASS INDEX: 36.06 KG/M2 | SYSTOLIC BLOOD PRESSURE: 124 MMHG | DIASTOLIC BLOOD PRESSURE: 82 MMHG | HEIGHT: 65 IN

## 2019-07-22 DIAGNOSIS — E78.00 HYPERCHOLESTEREMIA: Primary | ICD-10-CM

## 2019-07-22 DIAGNOSIS — E66.01 MORBID OBESITY DUE TO EXCESS CALORIES (HCC): ICD-10-CM

## 2019-07-22 DIAGNOSIS — Z00.00 PREVENTATIVE HEALTH CARE: ICD-10-CM

## 2019-07-22 DIAGNOSIS — F32.A DEPRESSION, UNSPECIFIED DEPRESSION TYPE: ICD-10-CM

## 2019-07-22 PROCEDURE — 1036F TOBACCO NON-USER: CPT | Performed by: NURSE PRACTITIONER

## 2019-07-22 PROCEDURE — G8417 CALC BMI ABV UP PARAM F/U: HCPCS | Performed by: NURSE PRACTITIONER

## 2019-07-22 PROCEDURE — G8427 DOCREV CUR MEDS BY ELIG CLIN: HCPCS | Performed by: NURSE PRACTITIONER

## 2019-07-22 PROCEDURE — 99214 OFFICE O/P EST MOD 30 MIN: CPT | Performed by: NURSE PRACTITIONER

## 2019-07-22 RX ORDER — GLUCOSAMINE HCL/CHONDROITIN SU 500-400 MG
CAPSULE ORAL
Qty: 100 EACH | Refills: 0 | Status: SHIPPED | OUTPATIENT
Start: 2019-07-22 | End: 2019-09-27

## 2019-07-22 RX ORDER — VALACYCLOVIR HYDROCHLORIDE 1 G/1
1000 TABLET, FILM COATED ORAL DAILY
Qty: 30 TABLET | Refills: 11 | Status: SHIPPED | OUTPATIENT
Start: 2019-07-22 | End: 2020-09-17

## 2019-07-22 RX ORDER — BUPROPION HYDROCHLORIDE 300 MG/1
300 TABLET ORAL EVERY MORNING
Qty: 30 TABLET | Refills: 11 | Status: SHIPPED | OUTPATIENT
Start: 2019-07-22 | End: 2020-09-17

## 2019-07-22 ASSESSMENT — ENCOUNTER SYMPTOMS
SHORTNESS OF BREATH: 0
RHINORRHEA: 0
DIARRHEA: 0
WHEEZING: 0
TROUBLE SWALLOWING: 0
ABDOMINAL PAIN: 0
EYE PAIN: 0
NAUSEA: 0
CHEST TIGHTNESS: 0
ABDOMINAL DISTENTION: 0

## 2019-07-22 NOTE — PROGRESS NOTES
Melody Jackson  1985   27 y.o. SUBJECT BRITNEY:    Chief Complaint   Patient presents with    Weight Management     %th  visit, finishing last rx of Adipex     Past Medical History:   Diagnosis Date    CCC (chronic calculus cholecystitis) 1/31/2012    Depression     Hyperlipidemia     Recurrent cold sores     On Valtrex One Gram Daily For This     Past Surgical History:   Procedure Laterality Date    CHOLECYSTECTOMY, LAPAROSCOPIC  01/31/2012    WISDOM TOOTH EXTRACTION  2002    \"4\"     Current Outpatient Medications   Medication Sig Dispense Refill    liraglutide-weight management (SAXENDA) 18 MG/3ML SOPN Wk 1 0.6 mg, wk 2 1.2mg, wk 3 1.8mg, wk 4 2.4mg and wk 5 on 3 mg. SAXENDA BMI 36 2 pen 0    Insulin Pen Needle 32G X 6 MM MISC Dispense 1, 50 pack box. 1 each 0    Alcohol Swabs 70 % PADS 1 daily. 100 each 0    naproxen (NAPROSYN) 500 MG tablet Take 1 tablet by mouth 2 times daily (with meals) 30 tablet 0    buPROPion (WELLBUTRIN XL) 300 MG extended release tablet Take 1 tablet by mouth every morning 30 tablet 11    valACYclovir (VALTREX) 1 g tablet Take 1 tablet by mouth daily 30 tablet 11    Norethin Ace-Eth Estrad-FE 1-20 MG-MCG(24) TABS Take by mouth daily       No current facility-administered medications for this visit. Family History   Problem Relation Age of Onset   [facility] Cancer Mother         \"Multiple Myleoma\"    Arthritis Mother     Depression Mother     High Cholesterol Mother    Warnell Bahai / Stillbirths Mother         X1    Stroke Mother         \"TIA'S\"    Mental Illness Mother         \"Dementia\"    Other Mother         \"Thyroid\", \"Glaucoma\"    Mental Illness Father         \"Dementia\"    Heart Disease Father         \"Open Heart For Valves\"    High Cholesterol Father     Other Father         \"Multiple Sclerosis\", \"Thyroid\", \"Glaucoma\"       HPI  Melody Jackson presents today for medication management visit regarding her comorbid condition of obesity.  Patient has lost -14 lbs during 3 months on adipex. Patient was out of town and lost friend unexpectedly. Patient only eating 1-2 meals per day. Hydration adequate, doing well with hydration and then some diet pop. Patient with no history of thyroid tumors or family history. ETOH rarely. No current exercise per patient. Review of Systems   Constitutional: Negative. Negative for appetite change, fatigue and fever. HENT: Negative for congestion, dental problem, hearing loss, rhinorrhea and trouble swallowing. Eyes: Negative for pain. Respiratory: Negative for chest tightness, shortness of breath and wheezing. Cardiovascular: Negative for chest pain, palpitations and leg swelling. Gastrointestinal: Negative for abdominal distention, abdominal pain, diarrhea and nausea. Endocrine: Negative for cold intolerance and polydipsia. Genitourinary: Negative for difficulty urinating and frequency. Musculoskeletal: Negative for arthralgias and gait problem. Skin: Negative for rash. Allergic/Immunologic: Negative for environmental allergies. Neurological: Negative for dizziness, seizures and syncope. Hematological: Does not bruise/bleed easily. Psychiatric/Behavioral: Negative for behavioral problems and suicidal ideas. Depression on Wellbutrin, well controlled. OBJECTIVE:     /82 (Site: Right Upper Arm, Position: Sitting, Cuff Size: Medium Adult)   Pulse 68   Resp 18   Ht 5' 5\" (1.651 m)   Wt 216 lb 6.4 oz (98.2 kg)   LMP 07/01/2019   BMI 36.01 kg/m²   Wt Readings from Last 3 Encounters:   07/22/19 216 lb 6.4 oz (98.2 kg)   06/20/19 218 lb 4.8 oz (99 kg)   05/24/19 219 lb 9.6 oz (99.6 kg)       Physical Exam   Constitutional: She is oriented to person, place, and time. She appears well-developed and well-nourished. Obese   HENT:   Head: Normocephalic and atraumatic.    Right Ear: Hearing and ear canal normal.   Left Ear: Hearing and ear canal normal.   Nose: Nose normal. Mouth/Throat: Uvula is midline and oropharynx is clear and moist.   Eyes: Pupils are equal, round, and reactive to light. Conjunctivae are normal.   Neck: Normal range of motion. Cardiovascular: Normal rate, regular rhythm and normal heart sounds. Pulmonary/Chest: Effort normal and breath sounds normal. She has no decreased breath sounds. She has no wheezes. She has no rhonchi. She has no rales. Abdominal: Soft. Bowel sounds are normal. There is no tenderness. Musculoskeletal: Normal range of motion. She exhibits no tenderness. In all 4 extremities. Neurological: She is alert and oriented to person, place, and time. She has normal strength. She exhibits normal muscle tone. GCS eye subscore is 4. GCS verbal subscore is 5. GCS motor subscore is 6. Skin: Skin is warm and dry. No rash noted. Psychiatric: She has a normal mood and affect. Her behavior is normal. Judgment normal.   Nursing note and vitals reviewed. ASSESSMENT/ PLAN:    1. Hypercholesteremia  - will need rechecked, has lost some weight. No currently on medications.   - TSH without Reflex; Future  - Comprehensive Metabolic Panel; Future  - CBC Auto Differential; Future  - Lipid Panel; Future    2. Morbid obesity due to excess calories ([address]  - Presents today for medication management of her chronic obesity.   - Patient struggling with diet compliance but has lost around-14 lbs 3 months of Adipex RX.   - Discussed weight loss medication transition options and regain considerations. - Patient a candidate for Contrave (given Wellbutrin currently, will need to taper) or Saxenda. Recommend saxenda if covered. Given savings card and would like to pursue.   - Needs to work on dietary habits, discussed 3-4 meals per day and not skipping meals.   - Discussed tapering to every other day on adipex for the last week. Obesity with a BMI of Body mass index is 36.01 kg/m².   No history of current insulin use- or other Contraindications: family history of medullary Thyroid Carcinoma (MTC), multiple endocrine neoplasia syndrome, pregnancy, gastroparesis, problems with pancreas, kidneys, liver, depression and/or suicidal thoughts. Was studied in >72years of age and around 1 year long clinical study. Saxenda, chemically modified version of human GLP-1 ( more weight loss than orlistat on trials). Better for patients with CVD and Type 2 DM, not on insulin. Neto Disla is a good candidate for Saxenda weight loss medication, along with nutrition consult and detailed diet plan. Patient was given Saxenda weight loss medication handout today and savings card. Dose: Initial 0.6 mg SUBQ. Increase weekly 1.2, 1.8, and 2.4 mg until recommend dose of 3 mg daily. Re-evaluate after 16 weeks. Discussed side effects in detail, most common but not limited to; diarrhea, constipation, mood changes, kidney problems, hypoglycemia (most common if used in conjunction with DM medications), increase lipase, increase heart rate. Aware to call with ANY side effects. Will monitor weight and vital signs weekly once patient starts medication. Patient informed of importance and compliance with diet plan. Needs to work on not skipping meals, 3-4 meals per day. Patient effecting metabolism. Discussed importance while on saxenda to avoid hypoglycemic episodes. Weight LOSS facilitators to consider or alternatives, Effexor, Zoloft and Prozac, and Wellbutrin best for depression/anxiety. Antipsychotics Ziprasidone.     - Weekly vital checks once starting medication.   - RTC 1 month with NP.      Orders Placed This Encounter   Procedures    TSH without Reflex     Standing Status:   Future     Standing Expiration Date:   7/22/2020    Comprehensive Metabolic Panel     Standing Status:   Future     Standing Expiration Date:   7/21/2020    CBC Auto Differential     Standing Status:   Future     Standing Expiration Date:   7/22/2020    Lipid Panel     Standing Status:

## 2019-08-02 ENCOUNTER — NURSE ONLY (OUTPATIENT)
Dept: BARIATRICS/WEIGHT MGMT | Age: 34
End: 2019-08-02

## 2019-08-02 ENCOUNTER — HOSPITAL ENCOUNTER (OUTPATIENT)
Age: 34
Discharge: HOME | End: 2019-08-02
Payer: COMMERCIAL

## 2019-08-02 VITALS
WEIGHT: 216.7 LBS | SYSTOLIC BLOOD PRESSURE: 124 MMHG | HEIGHT: 65 IN | OXYGEN SATURATION: 99 % | HEART RATE: 91 BPM | DIASTOLIC BLOOD PRESSURE: 76 MMHG | BODY MASS INDEX: 36.1 KG/M2

## 2019-08-02 DIAGNOSIS — E66.01 MORBID OBESITY DUE TO EXCESS CALORIES (HCC): Primary | ICD-10-CM

## 2019-08-02 LAB
ALBUMIN SERPL-MCNC: 4.3 GM/DL (ref 3.4–5)
ALP BLD-CCNC: 78 IU/L (ref 40–128)
ALT SERPL-CCNC: 16 U/L (ref 10–40)
ANION GAP SERPL CALCULATED.3IONS-SCNC: 11 MMOL/L (ref 4–16)
AST SERPL-CCNC: 16 IU/L (ref 15–37)
BASOPHILS ABSOLUTE: 0 K/CU MM
BASOPHILS RELATIVE PERCENT: 0.5 % (ref 0–1)
BILIRUB SERPL-MCNC: 0.5 MG/DL (ref 0–1)
BUN BLDV-MCNC: 13 MG/DL (ref 6–23)
CALCIUM SERPL-MCNC: 9.1 MG/DL (ref 8.3–10.6)
CHLORIDE BLD-SCNC: 99 MMOL/L (ref 99–110)
CHOLESTEROL: 213 MG/DL
CO2: 27 MMOL/L (ref 21–32)
CREAT SERPL-MCNC: 0.9 MG/DL (ref 0.6–1.1)
DIFFERENTIAL TYPE: ABNORMAL
EOSINOPHILS ABSOLUTE: 0.1 K/CU MM
EOSINOPHILS RELATIVE PERCENT: 0.6 % (ref 0–3)
GFR AFRICAN AMERICAN: >60 ML/MIN/1.73M2
GFR NON-AFRICAN AMERICAN: >60 ML/MIN/1.73M2
GLUCOSE BLD-MCNC: 71 MG/DL (ref 70–99)
HCT VFR BLD CALC: 46 % (ref 37–47)
HDLC SERPL-MCNC: 58 MG/DL
HEMOGLOBIN: 14.5 GM/DL (ref 12.5–16)
IMMATURE NEUTROPHIL %: 0.4 % (ref 0–0.43)
LDL CHOLESTEROL DIRECT: 149 MG/DL
LYMPHOCYTES ABSOLUTE: 2.6 K/CU MM
LYMPHOCYTES RELATIVE PERCENT: 33.2 % (ref 24–44)
MCH RBC QN AUTO: 30 PG (ref 27–31)
MCHC RBC AUTO-ENTMCNC: 31.5 % (ref 32–36)
MCV RBC AUTO: 95 FL (ref 78–100)
MONOCYTES ABSOLUTE: 0.6 K/CU MM
MONOCYTES RELATIVE PERCENT: 7 % (ref 0–4)
NUCLEATED RBC %: 0 %
PDW BLD-RTO: 13.1 % (ref 11.7–14.9)
PLATELET # BLD: 381 K/CU MM (ref 140–440)
PMV BLD AUTO: 9.8 FL (ref 7.5–11.1)
POTASSIUM SERPL-SCNC: 4.4 MMOL/L (ref 3.5–5.1)
RBC # BLD: 4.84 M/CU MM (ref 4.2–5.4)
SEGMENTED NEUTROPHILS ABSOLUTE COUNT: 4.6 K/CU MM
SEGMENTED NEUTROPHILS RELATIVE PERCENT: 58.3 % (ref 36–66)
SODIUM BLD-SCNC: 137 MMOL/L (ref 135–145)
TOTAL IMMATURE NEUTOROPHIL: 0.03 K/CU MM
TOTAL NUCLEATED RBC: 0 K/CU MM
TOTAL PROTEIN: 7.2 GM/DL (ref 6.4–8.2)
TRIGL SERPL-MCNC: 86 MG/DL
TSH HIGH SENSITIVITY: 2.17 UIU/ML (ref 0.27–4.2)
WBC # BLD: 7.8 K/CU MM (ref 4–10.5)

## 2019-08-02 PROCEDURE — 84443 ASSAY THYROID STIM HORMONE: CPT

## 2019-08-02 PROCEDURE — 80061 LIPID PANEL: CPT

## 2019-08-02 PROCEDURE — 85025 COMPLETE CBC W/AUTO DIFF WBC: CPT

## 2019-08-02 PROCEDURE — 36415 COLL VENOUS BLD VENIPUNCTURE: CPT

## 2019-08-02 PROCEDURE — 80053 COMPREHEN METABOLIC PANEL: CPT

## 2019-08-02 PROCEDURE — 83721 ASSAY OF BLOOD LIPOPROTEIN: CPT

## 2019-08-16 ENCOUNTER — NURSE ONLY (OUTPATIENT)
Dept: BARIATRICS/WEIGHT MGMT | Age: 34
End: 2019-08-16

## 2019-08-16 VITALS
BODY MASS INDEX: 35.79 KG/M2 | HEIGHT: 65 IN | SYSTOLIC BLOOD PRESSURE: 124 MMHG | HEART RATE: 92 BPM | WEIGHT: 214.8 LBS | DIASTOLIC BLOOD PRESSURE: 60 MMHG

## 2019-08-16 DIAGNOSIS — E66.01 MORBID OBESITY DUE TO EXCESS CALORIES (HCC): Primary | ICD-10-CM

## 2019-08-16 RX ORDER — LIRAGLUTIDE 6 MG/ML
INJECTION SUBCUTANEOUS
COMMUNITY
Start: 2019-07-22 | End: 2019-08-22

## 2019-08-22 ENCOUNTER — OFFICE VISIT (OUTPATIENT)
Dept: BARIATRICS/WEIGHT MGMT | Age: 34
End: 2019-08-22
Payer: COMMERCIAL

## 2019-08-22 VITALS
SYSTOLIC BLOOD PRESSURE: 110 MMHG | BODY MASS INDEX: 35.96 KG/M2 | HEART RATE: 59 BPM | HEIGHT: 65 IN | DIASTOLIC BLOOD PRESSURE: 74 MMHG | WEIGHT: 215.8 LBS

## 2019-08-22 DIAGNOSIS — E78.00 HYPERCHOLESTEREMIA: ICD-10-CM

## 2019-08-22 DIAGNOSIS — E66.9 OBESITY (BMI 30-39.9): Primary | ICD-10-CM

## 2019-08-22 PROCEDURE — 99213 OFFICE O/P EST LOW 20 MIN: CPT | Performed by: NURSE PRACTITIONER

## 2019-08-22 PROCEDURE — 1036F TOBACCO NON-USER: CPT | Performed by: NURSE PRACTITIONER

## 2019-08-22 PROCEDURE — G8427 DOCREV CUR MEDS BY ELIG CLIN: HCPCS | Performed by: NURSE PRACTITIONER

## 2019-08-22 PROCEDURE — G8417 CALC BMI ABV UP PARAM F/U: HCPCS | Performed by: NURSE PRACTITIONER

## 2019-08-22 ASSESSMENT — ENCOUNTER SYMPTOMS
ABDOMINAL PAIN: 0
ABDOMINAL DISTENTION: 0
CHEST TIGHTNESS: 0
EYE PAIN: 0
NAUSEA: 0
WHEEZING: 0
TROUBLE SWALLOWING: 0
RHINORRHEA: 0
BACK PAIN: 1
SHORTNESS OF BREATH: 0
DIARRHEA: 0

## 2019-08-26 ENCOUNTER — TELEPHONE (OUTPATIENT)
Dept: BARIATRICS/WEIGHT MGMT | Age: 34
End: 2019-08-26

## 2019-08-28 NOTE — TELEPHONE ENCOUNTER
States prior auth sent to our office for saxenda, will make sure received and completed. Limited on weight loss medication options given current medications. Will given 1 more starter kit until saxenda approval/denial completed. Patient instructed so titrate to 3 md daily. NO current side effects and tolerating well.

## 2019-09-27 ENCOUNTER — OFFICE VISIT (OUTPATIENT)
Dept: BARIATRICS/WEIGHT MGMT | Age: 34
End: 2019-09-27
Payer: COMMERCIAL

## 2019-09-27 VITALS
HEIGHT: 65 IN | BODY MASS INDEX: 35.81 KG/M2 | RESPIRATION RATE: 16 BRPM | DIASTOLIC BLOOD PRESSURE: 70 MMHG | SYSTOLIC BLOOD PRESSURE: 108 MMHG | HEART RATE: 81 BPM | WEIGHT: 214.9 LBS

## 2019-09-27 DIAGNOSIS — E78.00 HYPERCHOLESTEREMIA: Primary | ICD-10-CM

## 2019-09-27 DIAGNOSIS — E66.01 MORBID OBESITY DUE TO EXCESS CALORIES (HCC): ICD-10-CM

## 2019-09-27 PROCEDURE — 1036F TOBACCO NON-USER: CPT | Performed by: NURSE PRACTITIONER

## 2019-09-27 PROCEDURE — 99213 OFFICE O/P EST LOW 20 MIN: CPT | Performed by: NURSE PRACTITIONER

## 2019-09-27 PROCEDURE — G8417 CALC BMI ABV UP PARAM F/U: HCPCS | Performed by: NURSE PRACTITIONER

## 2019-09-27 PROCEDURE — G8427 DOCREV CUR MEDS BY ELIG CLIN: HCPCS | Performed by: NURSE PRACTITIONER

## 2019-09-27 ASSESSMENT — ENCOUNTER SYMPTOMS
RHINORRHEA: 0
TROUBLE SWALLOWING: 0
SHORTNESS OF BREATH: 0
WHEEZING: 0
ABDOMINAL PAIN: 0
EYE PAIN: 0
NAUSEA: 0
CHEST TIGHTNESS: 0
ABDOMINAL DISTENTION: 0
DIARRHEA: 0

## 2019-09-27 NOTE — PROGRESS NOTES
Denys Mason  1985   27 y.o. SUBJECT BRITNEY:    Chief Complaint   Patient presents with    Weight Management     7th medication visit, no rx x 2 weeks, ins wont cover Edmund Luciano     Past Medical History:   Diagnosis Date    CCC (chronic calculus cholecystitis) 1/31/2012    Depression     Hyperlipidemia     Recurrent cold sores     On Valtrex One Gram Daily For This     Past Surgical History:   Procedure Laterality Date    CHOLECYSTECTOMY, LAPAROSCOPIC  01/31/2012    WISDOM TOOTH EXTRACTION  2002    \"4\"     Current Outpatient Medications   Medication Sig Dispense Refill    Phentermine-Topiramate 3.75-23 MG CP24 Take 1 tablet by mouth daily for 14 days. Take in am. 14 capsule 0    Phentermine-Topiramate 7.5-46 MG CP24 Take 1 tablet by mouth daily for 30 days. Take in am. 30 capsule 0    buPROPion (WELLBUTRIN XL) 300 MG extended release tablet Take 1 tablet by mouth every morning 30 tablet 11    valACYclovir (VALTREX) 1 g tablet Take 1 tablet by mouth daily 30 tablet 11     No current facility-administered medications for this visit. Family History   Problem Relation Age of Onset   Cass Quinones Cancer Mother         \"Multiple Myleoma\"    Arthritis Mother     Depression Mother     High Cholesterol Mother    Larissa Cotto / Sarina Maker Mother         X1    Stroke Mother         \"TIA'S\"    Mental Illness Mother         \"Dementia\"    Other Mother         \"Thyroid\", \"Glaucoma\"    Mental Illness Father         \"Dementia\"    Heart Disease Father         \"Open Heart For Valves\"    High Cholesterol Father     Other Father         \"Multiple Sclerosis\", \"Thyroid\", \"Glaucoma\"       HPI  Denys Mason presents today for medication management visit. She was on saxenda but insurance would not cover 3 mg dose. Recently stopped 2 weeks ago. Patient doing well, has not attended support group. Will revisit with RD. Has lost -16.1 lbs thus far on weight oss journey.      Review of Systems   Constitutional:

## 2019-09-30 ENCOUNTER — TELEPHONE (OUTPATIENT)
Dept: BARIATRICS/WEIGHT MGMT | Age: 34
End: 2019-09-30

## 2019-09-30 DIAGNOSIS — E66.01 MORBID OBESITY DUE TO EXCESS CALORIES (HCC): ICD-10-CM

## 2019-09-30 DIAGNOSIS — E78.00 HYPERCHOLESTEREMIA: ICD-10-CM

## 2019-10-17 ENCOUNTER — OFFICE VISIT (OUTPATIENT)
Dept: BARIATRICS/WEIGHT MGMT | Age: 34
End: 2019-10-17
Payer: COMMERCIAL

## 2019-10-17 ENCOUNTER — OFFICE VISIT (OUTPATIENT)
Dept: BARIATRICS/WEIGHT MGMT | Age: 34
End: 2019-10-17

## 2019-10-17 VITALS
SYSTOLIC BLOOD PRESSURE: 122 MMHG | WEIGHT: 221.6 LBS | HEART RATE: 81 BPM | RESPIRATION RATE: 16 BRPM | HEIGHT: 65 IN | BODY MASS INDEX: 36.92 KG/M2 | DIASTOLIC BLOOD PRESSURE: 74 MMHG

## 2019-10-17 VITALS
WEIGHT: 221.6 LBS | HEIGHT: 65 IN | DIASTOLIC BLOOD PRESSURE: 74 MMHG | SYSTOLIC BLOOD PRESSURE: 122 MMHG | HEART RATE: 81 BPM | BODY MASS INDEX: 36.92 KG/M2

## 2019-10-17 DIAGNOSIS — E66.9 OBESITY (BMI 30-39.9): ICD-10-CM

## 2019-10-17 DIAGNOSIS — E66.9 OBESITY (BMI 30-39.9): Primary | ICD-10-CM

## 2019-10-17 DIAGNOSIS — Z79.899 MEDICATION MANAGEMENT: Primary | ICD-10-CM

## 2019-10-17 PROCEDURE — G8417 CALC BMI ABV UP PARAM F/U: HCPCS | Performed by: NURSE PRACTITIONER

## 2019-10-17 PROCEDURE — 99999 PR OFFICE/OUTPT VISIT,PROCEDURE ONLY: CPT

## 2019-10-17 PROCEDURE — G8484 FLU IMMUNIZE NO ADMIN: HCPCS | Performed by: NURSE PRACTITIONER

## 2019-10-17 PROCEDURE — G8427 DOCREV CUR MEDS BY ELIG CLIN: HCPCS | Performed by: NURSE PRACTITIONER

## 2019-10-17 PROCEDURE — 99212 OFFICE O/P EST SF 10 MIN: CPT | Performed by: NURSE PRACTITIONER

## 2019-10-17 PROCEDURE — 1036F TOBACCO NON-USER: CPT | Performed by: NURSE PRACTITIONER

## 2019-10-17 ASSESSMENT — ENCOUNTER SYMPTOMS
NAUSEA: 0
TROUBLE SWALLOWING: 0
SHORTNESS OF BREATH: 0
RHINORRHEA: 0
ABDOMINAL PAIN: 0
CHEST TIGHTNESS: 0
ABDOMINAL DISTENTION: 0
DIARRHEA: 0
EYE PAIN: 0
WHEEZING: 0

## 2019-11-01 ENCOUNTER — NURSE ONLY (OUTPATIENT)
Dept: BARIATRICS/WEIGHT MGMT | Age: 34
End: 2019-11-01

## 2019-11-01 VITALS
WEIGHT: 222.6 LBS | HEART RATE: 87 BPM | DIASTOLIC BLOOD PRESSURE: 68 MMHG | SYSTOLIC BLOOD PRESSURE: 116 MMHG | BODY MASS INDEX: 37.09 KG/M2 | RESPIRATION RATE: 18 BRPM | HEIGHT: 65 IN

## 2019-11-01 DIAGNOSIS — E66.9 OBESITY (BMI 30-39.9): Primary | ICD-10-CM

## 2019-11-08 ENCOUNTER — NURSE ONLY (OUTPATIENT)
Dept: SURGERY | Age: 34
End: 2019-11-08

## 2019-11-08 VITALS
DIASTOLIC BLOOD PRESSURE: 64 MMHG | SYSTOLIC BLOOD PRESSURE: 122 MMHG | WEIGHT: 221.2 LBS | BODY MASS INDEX: 36.85 KG/M2 | HEART RATE: 81 BPM | HEIGHT: 65 IN

## 2019-11-08 DIAGNOSIS — E66.01 MORBID OBESITY DUE TO EXCESS CALORIES (HCC): Primary | ICD-10-CM

## 2019-11-13 ENCOUNTER — OFFICE VISIT (OUTPATIENT)
Dept: BARIATRICS/WEIGHT MGMT | Age: 34
End: 2019-11-13
Payer: COMMERCIAL

## 2019-11-13 VITALS
SYSTOLIC BLOOD PRESSURE: 126 MMHG | HEIGHT: 65 IN | BODY MASS INDEX: 36.71 KG/M2 | DIASTOLIC BLOOD PRESSURE: 82 MMHG | HEART RATE: 90 BPM | RESPIRATION RATE: 16 BRPM | WEIGHT: 220.3 LBS

## 2019-11-13 DIAGNOSIS — E78.00 HYPERCHOLESTEREMIA: Primary | ICD-10-CM

## 2019-11-13 DIAGNOSIS — E66.01 MORBID OBESITY DUE TO EXCESS CALORIES (HCC): ICD-10-CM

## 2019-11-13 PROCEDURE — G8427 DOCREV CUR MEDS BY ELIG CLIN: HCPCS | Performed by: NURSE PRACTITIONER

## 2019-11-13 PROCEDURE — G8417 CALC BMI ABV UP PARAM F/U: HCPCS | Performed by: NURSE PRACTITIONER

## 2019-11-13 PROCEDURE — 1036F TOBACCO NON-USER: CPT | Performed by: NURSE PRACTITIONER

## 2019-11-13 PROCEDURE — 99213 OFFICE O/P EST LOW 20 MIN: CPT | Performed by: NURSE PRACTITIONER

## 2019-11-13 PROCEDURE — G8484 FLU IMMUNIZE NO ADMIN: HCPCS | Performed by: NURSE PRACTITIONER

## 2019-11-13 ASSESSMENT — ENCOUNTER SYMPTOMS
TROUBLE SWALLOWING: 0
CHEST TIGHTNESS: 0
EYE PAIN: 0
NAUSEA: 0
SHORTNESS OF BREATH: 0
WHEEZING: 0
ABDOMINAL DISTENTION: 0
RHINORRHEA: 0
DIARRHEA: 0
ABDOMINAL PAIN: 0

## 2019-11-27 ENCOUNTER — TELEPHONE (OUTPATIENT)
Dept: BARIATRICS/WEIGHT MGMT | Age: 34
End: 2019-11-27

## 2019-12-02 ENCOUNTER — TELEPHONE (OUTPATIENT)
Dept: BARIATRICS/WEIGHT MGMT | Age: 34
End: 2019-12-02

## 2019-12-04 ENCOUNTER — TELEPHONE (OUTPATIENT)
Dept: BARIATRICS/WEIGHT MGMT | Age: 34
End: 2019-12-04

## 2020-01-16 ENCOUNTER — TELEPHONE (OUTPATIENT)
Dept: BARIATRICS/WEIGHT MGMT | Age: 35
End: 2020-01-16

## 2020-01-16 NOTE — TELEPHONE ENCOUNTER
Patient came in for appointment with new insurance. Patient's insurance was through [employer] benefits which [employer] is not contracted with this third party adminstrator. Patient advised that we are not contracted with insurance but she is more than welcome to be self pay. She would pay $40 copay and then get a 40% discount off the office visit from today. We gave patient the amounts of all 3 levels of services that could be dropped, but since we were unable to give patient the exact amount because of the level of service being dropped she said forget it and walked out.

## 2020-08-19 RX ORDER — VALACYCLOVIR HYDROCHLORIDE 1 G/1
1000 TABLET, FILM COATED ORAL DAILY
Qty: 30 TABLET | Refills: 0 | Status: SHIPPED | OUTPATIENT
Start: 2020-08-19 | End: 2020-09-17 | Stop reason: SDUPTHER

## 2020-08-19 RX ORDER — BUPROPION HYDROCHLORIDE 300 MG/1
300 TABLET ORAL EVERY MORNING
Qty: 30 TABLET | Refills: 0 | Status: SHIPPED | OUTPATIENT
Start: 2020-08-19 | End: 2020-09-17

## 2020-08-19 NOTE — TELEPHONE ENCOUNTER
Pt requesting 30 day supply of bupropion and valacyclovir to [facility]. Pt scheduled VV for 9/15/20. Orders pending.

## 2020-09-17 ENCOUNTER — VIRTUAL VISIT (OUTPATIENT)
Dept: INTERNAL MEDICINE CLINIC | Age: 35
End: 2020-09-17
Payer: COMMERCIAL

## 2020-09-17 PROCEDURE — 99395 PREV VISIT EST AGE 18-39: CPT | Performed by: INTERNAL MEDICINE

## 2020-09-17 RX ORDER — VALACYCLOVIR HYDROCHLORIDE 1 G/1
1000 TABLET, FILM COATED ORAL DAILY
Qty: 30 TABLET | Refills: 11 | Status: SHIPPED | OUTPATIENT
Start: 2020-09-17 | End: 2020-10-22 | Stop reason: SDUPTHER

## 2020-09-17 RX ORDER — VALACYCLOVIR HYDROCHLORIDE 1 G/1
1000 TABLET, FILM COATED ORAL DAILY
Qty: 30 TABLET | Refills: 11 | Status: CANCELLED | OUTPATIENT
Start: 2020-09-17

## 2020-09-17 RX ORDER — BUPROPION HYDROCHLORIDE 300 MG/1
300 TABLET ORAL EVERY MORNING
Qty: 30 TABLET | Refills: 0 | Status: CANCELLED | OUTPATIENT
Start: 2020-09-17

## 2020-09-17 RX ORDER — BUPROPION HYDROCHLORIDE 300 MG/1
300 TABLET ORAL EVERY MORNING
Qty: 30 TABLET | Refills: 11 | Status: CANCELLED | OUTPATIENT
Start: 2020-09-17

## 2020-09-17 RX ORDER — DULOXETIN HYDROCHLORIDE 30 MG/1
30 CAPSULE, DELAYED RELEASE ORAL DAILY
Qty: 30 CAPSULE | Refills: 11 | Status: SHIPPED | OUTPATIENT
Start: 2020-09-17 | End: 2020-12-21

## 2020-09-17 RX ORDER — BUPROPION HYDROCHLORIDE 150 MG/1
150 TABLET ORAL EVERY MORNING
Qty: 14 TABLET | Refills: 0 | Status: SHIPPED | OUTPATIENT
Start: 2020-09-17 | End: 2021-09-24

## 2020-09-17 NOTE — PROGRESS NOTES
2020    TELEHEALTH EVALUATION -- Audio/Visual (During KAGQA-40 public health emergency)    HPI:    Pia Almodovar (:  1985) has requested an audio/video evaluation for the following concern(s):    Pt is interest in a different antidepressant  - she is not sure that the wellbutrin is working. She denies SI/HI. She has been exercising - walking, videos. Review of Systems    Prior to Visit Medications    Medication Sig Taking? Authorizing Provider   valACYclovir (VALTREX) 1 g tablet Take 1 tablet by mouth daily Yes Joanna Israel MD   DULoxetine (CYMBALTA) 30 MG extended release capsule Take 1 capsule by mouth daily Yes Joanna Israel MD   buPROPion (WELLBUTRIN XL) 150 MG extended release tablet Take 1 tablet by mouth every morning for 14 days Yes Joanna Israel MD       Social History     Tobacco Use    Smoking status: Never Smoker    Smokeless tobacco: Never Used   Substance Use Topics    Alcohol use: Yes     Alcohol/week: 1.0 standard drinks     Types: 1 Standard drinks or equivalent per week     Frequency: Monthly or less     Comment: \"Socially\"    Drug use: No          PHYSICAL EXAMINATION:  Constitutional: [x] Appears well-developed and well-nourished [x] No apparent distress      [] Abnormal-   Mental status  [x] Alert and awake  [x] Oriented to person/place/time [x]Able to follow commands             Psychiatric:       [x] Normal Affect [x] No Hallucinations      ASSESSMENT/PLAN:  1. Preventative health care - defer labs because of covid; encourage flu shot; encourage continued exercise    2. Depression, unspecified depression type - wean off the wellbutrin, will start cymbalta      Return in about 1 year (around 2021). Pia Almodovar is a 38 y.o. female being evaluated by a Virtual Visit (video visit) encounter to address concerns as mentioned above. A caregiver was present when appropriate.  Due to this being a TeleHealth encounter (During HNBCW-86 public health emergency), evaluation of the following organ systems was limited: Vitals/Constitutional/EENT/Resp/CV/GI//MS/Neuro/Skin/Heme-Lymph-Imm. Pursuant to the emergency declaration under the [facility], [address] [place] [address] authority and the [employer] Act, this Virtual Visit was conducted with patient's (and/or legal guardian's) consent, to reduce the patient's risk of exposure to COVID-19 and provide necessary medical care. The patient (and/or legal guardian) has also been advised to contact this office for worsening conditions or problems, and seek emergency medical treatment and/or call 911 if deemed necessary. Patient identification was verified at the start of the visit: Yes    Total time spent on this encounter: Not billed by time    Services were provided through a video synchronous discussion virtually to substitute for in-person clinic visit. Patient and provider were located at their individual homes. --[address], [place] on 9/17/2020 at 1:36 PM    An electronic signature was used to authenticate this note.

## 2020-10-22 RX ORDER — VALACYCLOVIR HYDROCHLORIDE 1 G/1
1000 TABLET, FILM COATED ORAL DAILY
Qty: 30 TABLET | Refills: 11 | Status: SHIPPED | OUTPATIENT
Start: 2020-10-22 | End: 2021-11-15

## 2020-12-21 ENCOUNTER — TELEPHONE (OUTPATIENT)
Dept: INTERNAL MEDICINE CLINIC | Age: 35
End: 2020-12-21

## 2020-12-21 RX ORDER — DULOXETIN HYDROCHLORIDE 60 MG/1
60 CAPSULE, DELAYED RELEASE ORAL DAILY
Qty: 30 CAPSULE | Refills: 11 | Status: SHIPPED | OUTPATIENT
Start: 2020-12-21 | End: 2021-09-24 | Stop reason: SDUPTHER

## 2020-12-21 NOTE — TELEPHONE ENCOUNTER
[place] [facility] [place]

## 2021-09-24 ENCOUNTER — OFFICE VISIT (OUTPATIENT)
Dept: INTERNAL MEDICINE CLINIC | Age: 36
End: 2021-09-24
Payer: COMMERCIAL

## 2021-09-24 VITALS
HEART RATE: 80 BPM | DIASTOLIC BLOOD PRESSURE: 76 MMHG | SYSTOLIC BLOOD PRESSURE: 122 MMHG | RESPIRATION RATE: 18 BRPM | OXYGEN SATURATION: 98 %

## 2021-09-24 DIAGNOSIS — Z00.00 PREVENTATIVE HEALTH CARE: Primary | ICD-10-CM

## 2021-09-24 DIAGNOSIS — Z00.00 PREVENTATIVE HEALTH CARE: ICD-10-CM

## 2021-09-24 DIAGNOSIS — N39.41 URGE INCONTINENCE: ICD-10-CM

## 2021-09-24 DIAGNOSIS — F32.A DEPRESSION, UNSPECIFIED DEPRESSION TYPE: ICD-10-CM

## 2021-09-24 LAB
A/G RATIO: 1.3 (ref 1.1–2.2)
ALBUMIN SERPL-MCNC: 4.2 G/DL (ref 3.4–5)
ALP BLD-CCNC: 88 U/L (ref 40–129)
ALT SERPL-CCNC: 18 U/L (ref 10–40)
ANION GAP SERPL CALCULATED.3IONS-SCNC: 13 MMOL/L (ref 3–16)
AST SERPL-CCNC: 22 U/L (ref 15–37)
BILIRUB SERPL-MCNC: 0.4 MG/DL (ref 0–1)
BUN BLDV-MCNC: 13 MG/DL (ref 7–20)
CALCIUM SERPL-MCNC: 9.1 MG/DL (ref 8.3–10.6)
CHLORIDE BLD-SCNC: 100 MMOL/L (ref 99–110)
CHOLESTEROL, TOTAL: 228 MG/DL (ref 0–199)
CO2: 26 MMOL/L (ref 21–32)
CREAT SERPL-MCNC: 0.7 MG/DL (ref 0.6–1.1)
GFR AFRICAN AMERICAN: >60
GFR NON-AFRICAN AMERICAN: >60
GLOBULIN: 3.3 G/DL
GLUCOSE BLD-MCNC: 75 MG/DL (ref 70–99)
HDLC SERPL-MCNC: 52 MG/DL (ref 40–60)
HEPATITIS C ANTIBODY INTERPRETATION: NORMAL
LDL CHOLESTEROL CALCULATED: 153 MG/DL
POTASSIUM SERPL-SCNC: 4.7 MMOL/L (ref 3.5–5.1)
SODIUM BLD-SCNC: 139 MMOL/L (ref 136–145)
TOTAL PROTEIN: 7.5 G/DL (ref 6.4–8.2)
TRIGL SERPL-MCNC: 116 MG/DL (ref 0–150)
VLDLC SERPL CALC-MCNC: 23 MG/DL

## 2021-09-24 PROCEDURE — 36415 COLL VENOUS BLD VENIPUNCTURE: CPT | Performed by: INTERNAL MEDICINE

## 2021-09-24 PROCEDURE — 99395 PREV VISIT EST AGE 18-39: CPT | Performed by: INTERNAL MEDICINE

## 2021-09-24 PROCEDURE — 90471 IMMUNIZATION ADMIN: CPT | Performed by: INTERNAL MEDICINE

## 2021-09-24 PROCEDURE — 90688 IIV4 VACCINE SPLT 0.5 ML IM: CPT | Performed by: INTERNAL MEDICINE

## 2021-09-24 RX ORDER — DULOXETIN HYDROCHLORIDE 60 MG/1
60 CAPSULE, DELAYED RELEASE ORAL DAILY
Qty: 30 CAPSULE | Refills: 11 | Status: SHIPPED | OUTPATIENT
Start: 2021-09-24 | End: 2022-10-04

## 2021-09-24 ASSESSMENT — PATIENT HEALTH QUESTIONNAIRE - PHQ9
1. LITTLE INTEREST OR PLEASURE IN DOING THINGS: 0
SUM OF ALL RESPONSES TO PHQ QUESTIONS 1-9: 0
SUM OF ALL RESPONSES TO PHQ9 QUESTIONS 1 & 2: 0
2. FEELING DOWN, DEPRESSED OR HOPELESS: 0
SUM OF ALL RESPONSES TO PHQ QUESTIONS 1-9: 0
SUM OF ALL RESPONSES TO PHQ QUESTIONS 1-9: 0

## 2021-09-24 NOTE — PROGRESS NOTES
Allison Butt  1985 09/24/21    SUBJECTIVE:    Pt with pain in the plantar feet L>R for the last year. This is arun severe when she first gets up and at the end of the day, located on the plantar heal.     Pt with intermittent overactive bladder - urgency, frequency. She denies dysuria, hematuria, abd pain. This has been present for months. Mood is doing well with the cymbalta. OBJECTIVE:    /76   Pulse 80   Resp 18   SpO2 98%     Physical Exam  Constitutional:       Appearance: She is well-developed. Eyes:      General: No scleral icterus. Conjunctiva/sclera: Conjunctivae normal.   Neck:      Thyroid: No thyromegaly. Trachea: No tracheal deviation. Cardiovascular:      Rate and Rhythm: Normal rate and regular rhythm. Heart sounds: No murmur heard. No friction rub. No gallop. Pulmonary:      Effort: No respiratory distress. Breath sounds: No wheezing or rales. Abdominal:      General: Bowel sounds are normal. There is no distension. Palpations: Abdomen is soft. There is no hepatomegaly or mass. Tenderness: There is no abdominal tenderness. There is no guarding or rebound. Musculoskeletal:      Cervical back: Neck supple. Lymphadenopathy:      Cervical: No cervical adenopathy. Skin:     Nails: There is no clubbing. Neurological:      Mental Status: She is alert and oriented to person, place, and time. Psychiatric:         Behavior: Behavior normal.         Judgment: Judgment normal.         ASSESSMENT:    1. Preventative health care    2. Urge incontinence    3. Depression, unspecified depression type        PLAN:    Stevie Tomlin was seen today for medication refill. Diagnoses and all orders for this visit:    Preventative health care - encourage exercise; BP at goal; check labs; encourage covid shot; flu shot today; up to date on gyn care  -     Comprehensive Metabolic Panel; Future  -     Lipid Panel;  Future  -     HEPATITIS C ANTIBODY; Future    Urge incontinence - will try bladder training, but if not improving can try ditropan    Depression, unspecified depression type - doing well with cymbalta  -     DULoxetine (CYMBALTA) 60 MG extended release capsule;  Take 1 capsule by mouth daily    Other orders  -     INFLUENZA, QUADV, 0.5ML, 6 MO AND OLDER, IM, MDV, (Rica Merino)

## 2021-10-04 ENCOUNTER — HOSPITAL ENCOUNTER (EMERGENCY)
Age: 36
Discharge: HOME | End: 2021-10-04
Attending: EMERGENCY MEDICINE
Payer: COMMERCIAL

## 2021-10-04 VITALS
HEART RATE: 91 BPM | WEIGHT: 240 LBS | RESPIRATION RATE: 18 BRPM | HEIGHT: 65 IN | BODY MASS INDEX: 39.99 KG/M2 | TEMPERATURE: 98 F | OXYGEN SATURATION: 98 %

## 2021-10-04 DIAGNOSIS — T14.8XXA PUNCTURE WOUND: ICD-10-CM
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
## 2021-10-04 PROCEDURE — 99283 EMERGENCY DEPT VISIT LOW MDM: CPT

## 2021-10-04 PROCEDURE — 6360000002 HC RX W HCPCS: Performed by: EMERGENCY MEDICINE

## 2021-10-04 PROCEDURE — 90714 TD VACC NO PRESV 7 YRS+ IM: CPT | Performed by: EMERGENCY MEDICINE

## 2021-10-04 PROCEDURE — 6370000000 HC RX 637 (ALT 250 FOR IP): Performed by: EMERGENCY MEDICINE

## 2021-10-04 PROCEDURE — 90471 IMMUNIZATION ADMIN: CPT | Performed by: EMERGENCY MEDICINE

## 2021-10-04 RX ORDER — AMOXICILLIN AND CLAVULANATE POTASSIUM 875; 125 MG/1; MG/1
1 TABLET, FILM COATED ORAL ONCE
Status: COMPLETED | OUTPATIENT
Start: 2021-10-04 | End: 2021-10-04

## 2021-10-04 RX ORDER — DIAPER,BRIEF,INFANT-TODD,DISP
EACH MISCELLANEOUS ONCE
Status: COMPLETED | OUTPATIENT
Start: 2021-10-04 | End: 2021-10-04

## 2021-10-04 RX ORDER — TETANUS AND DIPHTHERIA TOXOIDS ADSORBED 2; 2 [LF]/.5ML; [LF]/.5ML
0.5 INJECTION INTRAMUSCULAR ONCE
Status: COMPLETED | OUTPATIENT
Start: 2021-10-04 | End: 2021-10-04

## 2021-10-04 RX ORDER — AMOXICILLIN AND CLAVULANATE POTASSIUM 875; 125 MG/1; MG/1
1 TABLET, FILM COATED ORAL 2 TIMES DAILY
Qty: 20 TABLET | Refills: 0 | Status: SHIPPED | OUTPATIENT
Start: 2021-10-04 | End: 2021-10-14

## 2021-10-04 RX ADMIN — AMOXICILLIN AND CLAVULANATE POTASSIUM 1 TABLET: 875; 125 TABLET, FILM COATED ORAL at 23:31

## 2021-10-04 RX ADMIN — BACITRACIN ZINC: 500 OINTMENT TOPICAL at 23:32

## 2021-10-04 RX ADMIN — TETANUS AND DIPHTHERIA TOXOIDS ADSORBED 0.5 ML: 2; 2 INJECTION INTRAMUSCULAR at 23:32

## 2021-10-04 ASSESSMENT — PAIN DESCRIPTION - ORIENTATION: ORIENTATION: RIGHT

## 2021-10-04 ASSESSMENT — PAIN DESCRIPTION - DESCRIPTORS: DESCRIPTORS: BURNING

## 2021-10-04 ASSESSMENT — PAIN SCALES - GENERAL: PAINLEVEL_OUTOF10: 5

## 2021-10-04 ASSESSMENT — PAIN DESCRIPTION - PAIN TYPE: TYPE: ACUTE PAIN

## 2021-10-04 ASSESSMENT — PAIN DESCRIPTION - LOCATION: LOCATION: LEG

## 2021-10-04 NOTE — ED TRIAGE NOTES
Pt ripped blood pressure cuff in triage and stated \"this is too tight\". Could not get a BP on pt due to this. Triage RN notified.

## 2021-10-05 NOTE — ED PROVIDER NOTES
Emergency Department Encounter    Patient: Elle Jones  MRN: [id number]  : 1985  Date of Evaluation: 10/4/2021  ED Provider:  Nino Diaz MD    Triage Chief Complaint:   Animal Bite (Dog bite R inner leg)    [place]:  Elle Jones is a 39 y.o. female that presents with dog bite. Patient reports that she was bitten by a neighbor's dog. Patient reports she was bitten in her right lower medial thigh. Incident occurred several hours ago. Pain is 5 out of 10. Pain is exacerbated by touch and movement. No radiation of pain. Pain is improved with rest and elevation. Patient confirmed with dog's owner that he has had his vaccinations including rabies had all been completed.     ROS - see HPI, below listed is current ROS at time of my eval:  General:  No fevers, no chills, no weakness  Eyes:  No recent vison changes, no discharge  ENT:  No sore throat, no nasal congestion, no hearing changes  Cardiovascular:  No chest pain, no palpitations  Respiratory:  No shortness of breath, no cough, no wheezing  Gastrointestinal:  No pain, no nausea, no vomiting, no diarrhea  Musculoskeletal:  No muscle pain, no joint pain  Skin:  No rash, no pruritis, no easy bruising  Neurologic:  No speech problems, no headache, no extremity numbness, no extremity tingling, no extremity weakness  Psychiatric:  No anxiety  Genitourinary:  No dysuria, no hematuria  Extremities:  no edema, no pain    Past Medical History:   Diagnosis Date    CCC (chronic calculus cholecystitis) 2012    Depression     Hyperlipidemia     Recurrent cold sores     On Valtrex One Gram Daily For This     Past Surgical History:   Procedure Laterality Date    CHOLECYSTECTOMY, LAPAROSCOPIC  2012    WISDOM TOOTH EXTRACTION      \"4\"     Family History   Problem Relation Age of Onset    Cancer Mother         \"Multiple Myleoma\"    Arthritis Mother     Depression Mother     High Cholesterol Mother     Miscarriages / [place] Mother         X1    Stroke Mother         \"TIA'S\"    Mental Illness Mother         \"Dementia\"    Other Mother         \"Thyroid\", \"Glaucoma\"    Mental Illness Father         \"Dementia\"    Heart Disease Father         \"Open Heart For Valves\"    High Cholesterol Father     Other Father         \"Multiple Sclerosis\", \"Thyroid\", \"Glaucoma\"     Social History     Socioeconomic History    Marital status:      Spouse name: Not on file    Number of children: 1    Years of education: bachelors degree     Highest education level: Not on file   Occupational History    Occupation:     Tobacco Use    Smoking status: Never Smoker    Smokeless tobacco: Never Used   Substance and Sexual Activity    Alcohol use: Yes     Alcohol/week: 1.0 standard drinks     Types: 1 Standard drinks or equivalent per week     Comment: \"Socially\"    Drug use: No    Sexual activity: Yes     Partners: Male     Comment:  had vasectomy   Other Topics Concern    Not on file   Social History Narrative        Exercise - beach body programs - high intensity interval training 3 times weekly    Primary caregiver for parents    Seat belts - always     Social Determinants of Health     Financial Resource Strain:     Difficulty of Paying Living Expenses:    Food Insecurity:     Worried About Running Out of Food in the Last Year:     Ran Out of Food in the Last Year:    Transportation Needs:     Lack of Transportation (Medical):      Lack of Transportation (Non-Medical):    Physical Activity:     Days of Exercise per Week:     Minutes of Exercise per Session:    Stress:     Feeling of Stress :    Social Connections:     Frequency of Communication with Friends and Family:     Frequency of Social Gatherings with Friends and Family:     Attends Nondenominational Services:     Active Member of Clubs or Organizations:     Attends Club or Organization Meetings:     Marital Status:    Intimate Partner Violence: Respirations nonlabored. Abdominal:  Normal bowel sounds. Soft. Nontender. Non distended. Back:  No CVA tenderness to palpation     Neurological:  Alert and oriented times 3. No focal neuro deficits. Psychiatric:  Appropriate    I have reviewed and interpreted all of the currently available lab results from this visit (if applicable):  No results found for this visit on 10/04/21. Radiographs (if obtained):  Radiologist's Report Reviewed:  No results found. EKG (if obtained): (All EKG's are interpreted by myself in the absence of a cardiologist)      MDM:  54-year-old female status post dog bite. Discussed risk of rabies. Offered rabies immune globulin and vaccine. Patient reported that the owner of the dog confirmed that patient did have rabies vaccination but she does not have this proof with her. Tetanus updated. Small puncture wound on right medial distal thigh just above the knee. Half a centimeter; but well approximated. Wound was cleaned and dressed with petroleum based antibiotic. Will cover with Augmentin first dose given in ED. Strict ED return precautions were given. Discussed risks of infection. Discussed signs of infection. Patient was discharged in good condition with stable vitals. Clinical Impression:  1. Dog bite, initial encounter    2. Puncture wound      Disposition referral (if applicable):  No follow-up provider specified. Disposition medications (if applicable):  New Prescriptions    AMOXICILLIN-CLAVULANATE (AUGMENTIN) 875-125 MG PER TABLET    Take 1 tablet by mouth 2 times daily for 10 days     ED Provider Disposition Time  DISPOSITION        Comment: Please note this report has been produced using speech recognition software and may contain errors related to that system including errors in grammar, punctuation, and spelling, as well as words and phrases that may be inappropriate. Efforts were made to edit the dictations.         Elisa Rios MD  10/04/21 [id number]

## 2021-11-15 RX ORDER — VALACYCLOVIR HYDROCHLORIDE 1 G/1
TABLET, FILM COATED ORAL
Qty: 30 TABLET | Refills: 11 | Status: SHIPPED | OUTPATIENT
Start: 2021-11-15 | End: 2022-10-25 | Stop reason: SDUPTHER

## 2022-10-03 DIAGNOSIS — F32.A DEPRESSION, UNSPECIFIED DEPRESSION TYPE: ICD-10-CM

## 2022-10-04 RX ORDER — DULOXETIN HYDROCHLORIDE 60 MG/1
CAPSULE, DELAYED RELEASE ORAL
Qty: 30 CAPSULE | Refills: 0 | Status: SHIPPED | OUTPATIENT
Start: 2022-10-04 | End: 2022-10-25 | Stop reason: SDUPTHER

## 2022-10-25 ENCOUNTER — OFFICE VISIT (OUTPATIENT)
Dept: INTERNAL MEDICINE CLINIC | Age: 37
End: 2022-10-25
Payer: COMMERCIAL

## 2022-10-25 VITALS
HEART RATE: 104 BPM | WEIGHT: 264.8 LBS | BODY MASS INDEX: 44.07 KG/M2 | SYSTOLIC BLOOD PRESSURE: 120 MMHG | DIASTOLIC BLOOD PRESSURE: 64 MMHG | OXYGEN SATURATION: 94 % | RESPIRATION RATE: 14 BRPM

## 2022-10-25 DIAGNOSIS — R53.83 OTHER FATIGUE: ICD-10-CM

## 2022-10-25 DIAGNOSIS — M79.671 RIGHT FOOT PAIN: ICD-10-CM

## 2022-10-25 DIAGNOSIS — F32.A DEPRESSION, UNSPECIFIED DEPRESSION TYPE: ICD-10-CM

## 2022-10-25 DIAGNOSIS — Z00.00 PREVENTATIVE HEALTH CARE: Primary | ICD-10-CM

## 2022-10-25 PROCEDURE — 90471 IMMUNIZATION ADMIN: CPT | Performed by: INTERNAL MEDICINE

## 2022-10-25 PROCEDURE — 99395 PREV VISIT EST AGE 18-39: CPT | Performed by: INTERNAL MEDICINE

## 2022-10-25 PROCEDURE — 90674 CCIIV4 VAC NO PRSV 0.5 ML IM: CPT | Performed by: INTERNAL MEDICINE

## 2022-10-25 RX ORDER — VALACYCLOVIR HYDROCHLORIDE 1 G/1
1000 TABLET, FILM COATED ORAL DAILY
Qty: 90 TABLET | Refills: 3 | Status: SHIPPED | OUTPATIENT
Start: 2022-10-25

## 2022-10-25 RX ORDER — DULOXETIN HYDROCHLORIDE 60 MG/1
60 CAPSULE, DELAYED RELEASE ORAL DAILY
Qty: 90 CAPSULE | Refills: 3 | Status: SHIPPED | OUTPATIENT
Start: 2022-10-25

## 2022-10-25 SDOH — ECONOMIC STABILITY: FOOD INSECURITY: WITHIN THE PAST 12 MONTHS, YOU WORRIED THAT YOUR FOOD WOULD RUN OUT BEFORE YOU GOT MONEY TO BUY MORE.: NEVER TRUE

## 2022-10-25 SDOH — ECONOMIC STABILITY: FOOD INSECURITY: WITHIN THE PAST 12 MONTHS, THE FOOD YOU BOUGHT JUST DIDN'T LAST AND YOU DIDN'T HAVE MONEY TO GET MORE.: NEVER TRUE

## 2022-10-25 ASSESSMENT — PATIENT HEALTH QUESTIONNAIRE - PHQ9
9. THOUGHTS THAT YOU WOULD BE BETTER OFF DEAD, OR OF HURTING YOURSELF: 0
3. TROUBLE FALLING OR STAYING ASLEEP: 0
7. TROUBLE CONCENTRATING ON THINGS, SUCH AS READING THE NEWSPAPER OR WATCHING TELEVISION: 0
6. FEELING BAD ABOUT YOURSELF - OR THAT YOU ARE A FAILURE OR HAVE LET YOURSELF OR YOUR FAMILY DOWN: 0
10. IF YOU CHECKED OFF ANY PROBLEMS, HOW DIFFICULT HAVE THESE PROBLEMS MADE IT FOR YOU TO DO YOUR WORK, TAKE CARE OF THINGS AT HOME, OR GET ALONG WITH OTHER PEOPLE: 0
5. POOR APPETITE OR OVEREATING: 0
SUM OF ALL RESPONSES TO PHQ9 QUESTIONS 1 & 2: 1
SUM OF ALL RESPONSES TO PHQ QUESTIONS 1-9: 2
1. LITTLE INTEREST OR PLEASURE IN DOING THINGS: 0
2. FEELING DOWN, DEPRESSED OR HOPELESS: 1
SUM OF ALL RESPONSES TO PHQ QUESTIONS 1-9: 2
4. FEELING TIRED OR HAVING LITTLE ENERGY: 1
SUM OF ALL RESPONSES TO PHQ QUESTIONS 1-9: 2
SUM OF ALL RESPONSES TO PHQ QUESTIONS 1-9: 2
8. MOVING OR SPEAKING SO SLOWLY THAT OTHER PEOPLE COULD HAVE NOTICED. OR THE OPPOSITE, BEING SO FIGETY OR RESTLESS THAT YOU HAVE BEEN MOVING AROUND A LOT MORE THAN USUAL: 0

## 2022-10-25 ASSESSMENT — SOCIAL DETERMINANTS OF HEALTH (SDOH): HOW HARD IS IT FOR YOU TO PAY FOR THE VERY BASICS LIKE FOOD, HOUSING, MEDICAL CARE, AND HEATING?: NOT HARD AT ALL

## 2022-10-25 NOTE — PROGRESS NOTES
aDvid Ramirez  1985  10/25/22    SUBJECTIVE:      Pt complains of 2 years of right foot pain, arun after she has been active. This has limited her exercise. Pain is located on the lateral palmar foot. This started in both feet, but the left foot pain has resolved. This is arun severe early in the morning, after sitting, and at the end of the day. She has used compression socks, ice, stretching with little benefit. Mood is doing well with cymbalta. Exercise has been limited because of foot pain. OBJECTIVE:    /64   Pulse (!) 104   Resp 14   Wt 264 lb 12.8 oz (120.1 kg)   SpO2 94%   BMI 44.07 kg/m²     Physical Exam  Constitutional:       Appearance: She is well-developed. Eyes:      General: No scleral icterus. Conjunctiva/sclera: Conjunctivae normal.   Neck:      Thyroid: No thyromegaly. Trachea: No tracheal deviation. Cardiovascular:      Rate and Rhythm: Normal rate and regular rhythm. Heart sounds: No murmur heard. No friction rub. No gallop. Pulmonary:      Effort: No respiratory distress. Breath sounds: No wheezing or rales. Abdominal:      General: Bowel sounds are normal. There is no distension. Palpations: Abdomen is soft. There is no hepatomegaly or mass. Tenderness: There is no abdominal tenderness. There is no guarding or rebound. Musculoskeletal:      Cervical back: Neck supple. Lymphadenopathy:      Cervical: No cervical adenopathy. Skin:     Nails: There is no clubbing. Neurological:      Mental Status: She is alert and oriented to person, place, and time. Psychiatric:         Behavior: Behavior normal.         Judgment: Judgment normal.       ASSESSMENT:    1. Preventative health care    2. Depression, unspecified depression type    3. Right foot pain    4. Other fatigue        PLAN:    Marleny Barr was seen today for annual exam, foot pain and medication refill.     Diagnoses and all orders for this visit:    Preventative health care - check labs; flu shot today; encourage covid vaccine; up to date on GYN care; BP at goal; check labs; encourage exercise  -     Comprehensive Metabolic Panel; Future  -     CBC; Future  -     Lipid Panel; Future    Depression, unspecified depression type - doing well, no change  -     DULoxetine (CYMBALTA) 60 MG extended release capsule; Take 1 capsule by mouth daily    Right foot pain - unclear etiology. I will refer to Dr Frankie Knight  -     Amb External Referral To Podiatry    Other fatigue - check labs, may be related to lack of exercise  -     CBC; Future  -     Vitamin B12; Future  -     TSH; Future    Other orders  -     valACYclovir (VALTREX) 1 g tablet;  Take 1 tablet by mouth daily  -     Influenza, FLUCELVAX, (age 10 mo+), IM, Preservative Free, 0.5 mL

## 2022-12-06 DIAGNOSIS — R53.83 OTHER FATIGUE: ICD-10-CM

## 2022-12-06 DIAGNOSIS — Z00.00 PREVENTATIVE HEALTH CARE: ICD-10-CM

## 2022-12-06 LAB
A/G RATIO: 1.5 (ref 1.1–2.2)
ALBUMIN SERPL-MCNC: 3.8 G/DL (ref 3.4–5)
ALP BLD-CCNC: 74 U/L (ref 40–129)
ALT SERPL-CCNC: 25 U/L (ref 10–40)
ANION GAP SERPL CALCULATED.3IONS-SCNC: 13 MMOL/L (ref 3–16)
AST SERPL-CCNC: 22 U/L (ref 15–37)
BILIRUB SERPL-MCNC: 0.3 MG/DL (ref 0–1)
BUN BLDV-MCNC: 10 MG/DL (ref 7–20)
CALCIUM SERPL-MCNC: 9 MG/DL (ref 8.3–10.6)
CHLORIDE BLD-SCNC: 106 MMOL/L (ref 99–110)
CHOLESTEROL, TOTAL: 254 MG/DL (ref 0–199)
CO2: 24 MMOL/L (ref 21–32)
CREAT SERPL-MCNC: 0.8 MG/DL (ref 0.6–1.1)
GFR SERPL CREATININE-BSD FRML MDRD: >60 ML/MIN/{1.73_M2}
GLUCOSE BLD-MCNC: 83 MG/DL (ref 70–99)
HCT VFR BLD CALC: 41.2 % (ref 36–48)
HDLC SERPL-MCNC: 52 MG/DL (ref 40–60)
HEMOGLOBIN: 13.4 G/DL (ref 12–16)
LDL CHOLESTEROL CALCULATED: 174 MG/DL
MCH RBC QN AUTO: 29.5 PG (ref 26–34)
MCHC RBC AUTO-ENTMCNC: 32.5 G/DL (ref 31–36)
MCV RBC AUTO: 90.8 FL (ref 80–100)
PDW BLD-RTO: 14.4 % (ref 12.4–15.4)
PLATELET # BLD: 343 K/UL (ref 135–450)
PMV BLD AUTO: 8.2 FL (ref 5–10.5)
POTASSIUM SERPL-SCNC: 4.5 MMOL/L (ref 3.5–5.1)
RBC # BLD: 4.54 M/UL (ref 4–5.2)
SODIUM BLD-SCNC: 143 MMOL/L (ref 136–145)
TOTAL PROTEIN: 6.3 G/DL (ref 6.4–8.2)
TRIGL SERPL-MCNC: 140 MG/DL (ref 0–150)
TSH SERPL DL<=0.05 MIU/L-ACNC: 2.47 UIU/ML (ref 0.27–4.2)
VITAMIN B-12: 462 PG/ML (ref 211–911)
VLDLC SERPL CALC-MCNC: 28 MG/DL
WBC # BLD: 5.5 K/UL (ref 4–11)

## 2022-12-06 PROCEDURE — 36415 COLL VENOUS BLD VENIPUNCTURE: CPT | Performed by: INTERNAL MEDICINE

## 2022-12-08 DIAGNOSIS — E78.00 HYPERCHOLESTEROLEMIA: Primary | ICD-10-CM

## 2022-12-08 RX ORDER — ROSUVASTATIN CALCIUM 10 MG/1
10 TABLET, COATED ORAL NIGHTLY
Qty: 30 TABLET | Refills: 90 | Status: SHIPPED | OUTPATIENT
Start: 2022-12-08

## 2023-03-22 ENCOUNTER — TELEMEDICINE (OUTPATIENT)
Dept: INTERNAL MEDICINE CLINIC | Age: 38
End: 2023-03-22
Payer: COMMERCIAL

## 2023-03-22 DIAGNOSIS — J01.00 ACUTE NON-RECURRENT MAXILLARY SINUSITIS: Primary | ICD-10-CM

## 2023-03-22 PROCEDURE — 99213 OFFICE O/P EST LOW 20 MIN: CPT | Performed by: INTERNAL MEDICINE

## 2023-03-22 RX ORDER — FLUTICASONE PROPIONATE 50 MCG
2 SPRAY, SUSPENSION (ML) NASAL DAILY
Qty: 16 G | Refills: 0 | Status: SHIPPED | OUTPATIENT
Start: 2023-03-22

## 2023-03-22 RX ORDER — PREDNISONE 10 MG/1
TABLET ORAL
Qty: 20 TABLET | Refills: 0 | Status: SHIPPED | OUTPATIENT
Start: 2023-03-22

## 2023-03-22 RX ORDER — AZITHROMYCIN 250 MG/1
250 TABLET, FILM COATED ORAL SEE ADMIN INSTRUCTIONS
Qty: 6 TABLET | Refills: 0 | Status: SHIPPED | OUTPATIENT
Start: 2023-03-22 | End: 2023-03-27

## 2023-03-22 SDOH — ECONOMIC STABILITY: FOOD INSECURITY: WITHIN THE PAST 12 MONTHS, YOU WORRIED THAT YOUR FOOD WOULD RUN OUT BEFORE YOU GOT MONEY TO BUY MORE.: NEVER TRUE

## 2023-03-22 SDOH — ECONOMIC STABILITY: INCOME INSECURITY: HOW HARD IS IT FOR YOU TO PAY FOR THE VERY BASICS LIKE FOOD, HOUSING, MEDICAL CARE, AND HEATING?: NOT HARD AT ALL

## 2023-03-22 SDOH — ECONOMIC STABILITY: FOOD INSECURITY: WITHIN THE PAST 12 MONTHS, THE FOOD YOU BOUGHT JUST DIDN'T LAST AND YOU DIDN'T HAVE MONEY TO GET MORE.: NEVER TRUE

## 2023-03-22 SDOH — ECONOMIC STABILITY: HOUSING INSECURITY
IN THE LAST 12 MONTHS, WAS THERE A TIME WHEN YOU DID NOT HAVE A STEADY PLACE TO SLEEP OR SLEPT IN A SHELTER (INCLUDING NOW)?: NO

## 2023-03-22 ASSESSMENT — PATIENT HEALTH QUESTIONNAIRE - PHQ9
2. FEELING DOWN, DEPRESSED OR HOPELESS: 1
SUM OF ALL RESPONSES TO PHQ QUESTIONS 1-9: 1
7. TROUBLE CONCENTRATING ON THINGS, SUCH AS READING THE NEWSPAPER OR WATCHING TELEVISION: 0
8. MOVING OR SPEAKING SO SLOWLY THAT OTHER PEOPLE COULD HAVE NOTICED. OR THE OPPOSITE, BEING SO FIGETY OR RESTLESS THAT YOU HAVE BEEN MOVING AROUND A LOT MORE THAN USUAL: 0
6. FEELING BAD ABOUT YOURSELF - OR THAT YOU ARE A FAILURE OR HAVE LET YOURSELF OR YOUR FAMILY DOWN: 0
10. IF YOU CHECKED OFF ANY PROBLEMS, HOW DIFFICULT HAVE THESE PROBLEMS MADE IT FOR YOU TO DO YOUR WORK, TAKE CARE OF THINGS AT HOME, OR GET ALONG WITH OTHER PEOPLE: 0
SUM OF ALL RESPONSES TO PHQ QUESTIONS 1-9: 1
3. TROUBLE FALLING OR STAYING ASLEEP: 0
SUM OF ALL RESPONSES TO PHQ QUESTIONS 1-9: 1
1. LITTLE INTEREST OR PLEASURE IN DOING THINGS: 0
4. FEELING TIRED OR HAVING LITTLE ENERGY: 0
9. THOUGHTS THAT YOU WOULD BE BETTER OFF DEAD, OR OF HURTING YOURSELF: 0
SUM OF ALL RESPONSES TO PHQ9 QUESTIONS 1 & 2: 1
5. POOR APPETITE OR OVEREATING: 0
SUM OF ALL RESPONSES TO PHQ QUESTIONS 1-9: 1

## 2023-03-22 NOTE — PROGRESS NOTES
distress      [] Abnormal-   Mental status  [x] Alert and awake  [x] Oriented to person/place/time [x]Able to follow commands             Psychiatric:       [x] Normal Affect [x] No Hallucinations      ASSESSMENT/PLAN:  1. Acute non-recurrent maxillary sinusitis - likely a secondary sinusitis +/- bronchitis. Will tx with azith, prednisone, flonase, and sinus rinse. - azithromycin (ZITHROMAX) 250 MG tablet; Take 1 tablet by mouth See Admin Instructions for 5 days 500mg on day 1 followed by 250mg on days 2 - 5  Dispense: 6 tablet; Refill: 0  - predniSONE (DELTASONE) 10 MG tablet; Take 4 tablets daily for 2 days, then 3 tablets daily for 2 days, then two tablets daily for 2 days, then one tablet daily for 2 days  Dispense: 20 tablet; Refill: 0  - fluticasone (FLONASE) 50 MCG/ACT nasal spray; 2 sprays by Each Nostril route daily  Dispense: 16 g; Refill: 0    No follow-ups on file. Camille Orosco, was evaluated through a synchronous (real-time) audio-video encounter. The patient (or guardian if applicable) is aware that this is a billable service, which includes applicable co-pays. This Virtual Visit was conducted with patient's (and/or legal guardian's) consent. The visit was conducted pursuant to the emergency declaration under the [address] [place] [address], [address] [place] [address] authority and the [employer] and [employer] General Act. Patient identification was verified, and a caregiver was present when appropriate. The patient was located at Home: [address] [place] [address]  Provider was located at Home ([address]: [place]        Total time spent on this encounter: Not billed by time    --Nikki Townsend MD on 3/22/2023 at 3:35 PM    An electronic signature was used to authenticate this note.

## 2023-10-21 LAB
A/G RATIO: 1.3 (ref 1.2–2.2)
ALBUMIN SERPL-MCNC: 3.9 G/DL (ref 3.9–4.9)
ALP BLD-CCNC: 91 IU/L (ref 44–121)
ALT SERPL-CCNC: 13 IU/L (ref 0–32)
AST SERPL-CCNC: 16 IU/L (ref 0–40)
BILIRUB SERPL-MCNC: <0.2 MG/DL (ref 0–1.2)
BUN / CREAT RATIO: 16 (ref 9–23)
BUN BLDV-MCNC: 14 MG/DL (ref 6–20)
CALCIUM SERPL-MCNC: 8.9 MG/DL (ref 8.7–10.2)
CHLORIDE BLD-SCNC: 104 MMOL/L (ref 96–106)
CHOLESTEROL, TOTAL: 152 MG/DL (ref 100–199)
CO2: 22 MMOL/L (ref 20–29)
COMMENT: NORMAL
CREAT SERPL-MCNC: 0.87 MG/DL (ref 0.57–1)
ESTIMATED GLOMERULAR FILTRATION RATE CREATININE EQUATION: 87 ML/MIN/1.73
GLOBULIN: 3 G/DL (ref 1.5–4.5)
GLUCOSE BLD-MCNC: 81 MG/DL (ref 70–99)
HDLC SERPL-MCNC: 52 MG/DL
LDL CHOLESTEROL CALCULATED: 84 MG/DL (ref 0–99)
POTASSIUM SERPL-SCNC: 5 MMOL/L (ref 3.5–5.2)
SODIUM BLD-SCNC: 140 MMOL/L (ref 134–144)
TOTAL PROTEIN: 6.9 G/DL (ref 6–8.5)
TRIGL SERPL-MCNC: 84 MG/DL (ref 0–149)
VLDLC SERPL CALC-MCNC: 16 MG/DL (ref 5–40)

## 2023-10-26 ENCOUNTER — OFFICE VISIT (OUTPATIENT)
Dept: INTERNAL MEDICINE CLINIC | Age: 38
End: 2023-10-26
Payer: COMMERCIAL

## 2023-10-26 VITALS
BODY MASS INDEX: 44.39 KG/M2 | DIASTOLIC BLOOD PRESSURE: 76 MMHG | HEIGHT: 65 IN | OXYGEN SATURATION: 94 % | HEART RATE: 96 BPM | TEMPERATURE: 97 F | WEIGHT: 266.4 LBS | SYSTOLIC BLOOD PRESSURE: 122 MMHG

## 2023-10-26 DIAGNOSIS — F32.A DEPRESSION, UNSPECIFIED DEPRESSION TYPE: ICD-10-CM

## 2023-10-26 DIAGNOSIS — E78.00 HYPERCHOLESTEREMIA: ICD-10-CM

## 2023-10-26 DIAGNOSIS — E66.01 MORBID OBESITY DUE TO EXCESS CALORIES (HCC): ICD-10-CM

## 2023-10-26 DIAGNOSIS — Z00.00 PREVENTATIVE HEALTH CARE: Primary | ICD-10-CM

## 2023-10-26 PROCEDURE — 99395 PREV VISIT EST AGE 18-39: CPT | Performed by: INTERNAL MEDICINE

## 2023-10-26 RX ORDER — ROSUVASTATIN CALCIUM 10 MG/1
10 TABLET, COATED ORAL NIGHTLY
Qty: 90 TABLET | Refills: 3 | Status: SHIPPED | OUTPATIENT
Start: 2023-10-26

## 2023-10-26 RX ORDER — PHENTERMINE HYDROCHLORIDE 37.5 MG/1
37.5 TABLET ORAL
Qty: 30 TABLET | Refills: 0 | Status: SHIPPED | OUTPATIENT
Start: 2023-10-26 | End: 2023-11-25

## 2023-10-26 RX ORDER — VALACYCLOVIR HYDROCHLORIDE 1 G/1
1000 TABLET, FILM COATED ORAL DAILY
Qty: 90 TABLET | Refills: 3 | Status: SHIPPED | OUTPATIENT
Start: 2023-10-26

## 2023-10-26 RX ORDER — DULOXETIN HYDROCHLORIDE 60 MG/1
60 CAPSULE, DELAYED RELEASE ORAL DAILY
Qty: 90 CAPSULE | Refills: 3 | Status: SHIPPED | OUTPATIENT
Start: 2023-10-26

## 2023-11-27 ENCOUNTER — OFFICE VISIT (OUTPATIENT)
Dept: INTERNAL MEDICINE CLINIC | Age: 38
End: 2023-11-27
Payer: COMMERCIAL

## 2023-11-27 VITALS
SYSTOLIC BLOOD PRESSURE: 126 MMHG | WEIGHT: 260 LBS | DIASTOLIC BLOOD PRESSURE: 82 MMHG | RESPIRATION RATE: 18 BRPM | HEART RATE: 80 BPM | BODY MASS INDEX: 43.27 KG/M2 | OXYGEN SATURATION: 97 %

## 2023-11-27 DIAGNOSIS — E66.01 MORBID OBESITY DUE TO EXCESS CALORIES (HCC): ICD-10-CM

## 2023-11-27 PROCEDURE — 99213 OFFICE O/P EST LOW 20 MIN: CPT | Performed by: INTERNAL MEDICINE

## 2023-11-27 RX ORDER — PHENTERMINE HYDROCHLORIDE 37.5 MG/1
37.5 TABLET ORAL
Qty: 30 TABLET | Refills: 0 | Status: SHIPPED | OUTPATIENT
Start: 2023-11-27 | End: 2023-12-27

## 2023-12-15 DIAGNOSIS — F32.A DEPRESSION, UNSPECIFIED DEPRESSION TYPE: ICD-10-CM

## 2023-12-15 DIAGNOSIS — E66.01 MORBID OBESITY DUE TO EXCESS CALORIES (HCC): ICD-10-CM

## 2023-12-15 RX ORDER — DULOXETIN HYDROCHLORIDE 60 MG/1
60 CAPSULE, DELAYED RELEASE ORAL DAILY
Qty: 90 CAPSULE | Refills: 3 | Status: SHIPPED | OUTPATIENT
Start: 2023-12-15

## 2023-12-15 RX ORDER — VALACYCLOVIR HYDROCHLORIDE 1 G/1
1000 TABLET, FILM COATED ORAL DAILY
Qty: 90 TABLET | Refills: 3 | Status: SHIPPED | OUTPATIENT
Start: 2023-12-15

## 2023-12-15 RX ORDER — ROSUVASTATIN CALCIUM 10 MG/1
10 TABLET, COATED ORAL NIGHTLY
Qty: 90 TABLET | Refills: 3 | Status: SHIPPED | OUTPATIENT
Start: 2023-12-15

## 2023-12-15 RX ORDER — PHENTERMINE HYDROCHLORIDE 37.5 MG/1
37.5 TABLET ORAL
Qty: 30 TABLET | Refills: 0 | Status: SHIPPED | OUTPATIENT
Start: 2023-12-27 | End: 2024-01-26

## 2023-12-29 ENCOUNTER — OFFICE VISIT (OUTPATIENT)
Dept: INTERNAL MEDICINE CLINIC | Age: 38
End: 2023-12-29
Payer: COMMERCIAL

## 2023-12-29 VITALS
SYSTOLIC BLOOD PRESSURE: 122 MMHG | DIASTOLIC BLOOD PRESSURE: 72 MMHG | HEIGHT: 65 IN | HEART RATE: 90 BPM | OXYGEN SATURATION: 98 % | BODY MASS INDEX: 43.32 KG/M2 | WEIGHT: 260 LBS

## 2023-12-29 DIAGNOSIS — E66.01 MORBID OBESITY DUE TO EXCESS CALORIES (HCC): Primary | ICD-10-CM

## 2023-12-29 DIAGNOSIS — F32.A DEPRESSION, UNSPECIFIED DEPRESSION TYPE: ICD-10-CM

## 2023-12-29 PROCEDURE — 99213 OFFICE O/P EST LOW 20 MIN: CPT | Performed by: INTERNAL MEDICINE

## 2023-12-29 RX ORDER — DULOXETIN HYDROCHLORIDE 60 MG/1
60 CAPSULE, DELAYED RELEASE ORAL DAILY
Qty: 90 CAPSULE | Refills: 3 | Status: SHIPPED | OUTPATIENT
Start: 2023-12-29

## 2024-01-26 ENCOUNTER — OFFICE VISIT (OUTPATIENT)
Dept: INTERNAL MEDICINE CLINIC | Age: 39
End: 2024-01-26
Payer: COMMERCIAL

## 2024-01-26 VITALS
SYSTOLIC BLOOD PRESSURE: 122 MMHG | TEMPERATURE: 97.2 F | HEART RATE: 90 BPM | WEIGHT: 256.4 LBS | DIASTOLIC BLOOD PRESSURE: 76 MMHG | BODY MASS INDEX: 42.67 KG/M2 | OXYGEN SATURATION: 96 %

## 2024-01-26 DIAGNOSIS — E66.01 MORBID OBESITY DUE TO EXCESS CALORIES (HCC): ICD-10-CM

## 2024-01-26 PROCEDURE — 99213 OFFICE O/P EST LOW 20 MIN: CPT | Performed by: INTERNAL MEDICINE

## 2024-01-26 RX ORDER — PHENTERMINE HYDROCHLORIDE 37.5 MG/1
37.5 TABLET ORAL
Qty: 90 TABLET | Refills: 0 | Status: SHIPPED | OUTPATIENT
Start: 2024-01-26 | End: 2024-04-25

## 2024-01-26 NOTE — PROGRESS NOTES
Ninoska Lehman  1985 01/26/24    SUBJECTIVE:      Pt lost 4 pounds over the last month. She has noticed decreased appetite. She eats lunch and dinner, does not eat breakfast.     OBJECTIVE:    /76 (Site: Left Upper Arm, Position: Sitting, Cuff Size: Medium Adult)   Pulse 90   Temp 97.2 °F (36.2 °C) (Infrared)   Wt 116.3 kg (256 lb 6.4 oz)   SpO2 96%   BMI 42.67 kg/m²     Physical Exam    ASSESSMENT:    1. Morbid obesity due to excess calories (HCC)        PLAN:    Ninoska was seen today for 1 month follow-up.    Diagnoses and all orders for this visit:    Morbid obesity due to excess calories (HCC)  discussed options. Will cont adipex as she has lost weight with the med ~ 5%. Will check urine drug screen and sign med contract. If/when her insurance changes we could consider ozempic, monjouro  -     Drug Panel-PM-HI Res-UR Interp-A; Future  -     phentermine (ADIPEX-P) 37.5 MG tablet; Take 1 tablet by mouth every morning (before breakfast) for 90 days. Max Daily Amount: 37.5 mg

## 2024-01-30 LAB

## 2024-04-30 LAB — MAMMOGRAPHY, EXTERNAL: NORMAL

## 2024-06-27 ENCOUNTER — OFFICE VISIT (OUTPATIENT)
Dept: INTERNAL MEDICINE CLINIC | Age: 39
End: 2024-06-27
Payer: COMMERCIAL

## 2024-06-27 VITALS
OXYGEN SATURATION: 95 % | DIASTOLIC BLOOD PRESSURE: 80 MMHG | WEIGHT: 266.4 LBS | SYSTOLIC BLOOD PRESSURE: 118 MMHG | BODY MASS INDEX: 44.33 KG/M2 | HEART RATE: 75 BPM

## 2024-06-27 DIAGNOSIS — G89.29 CHRONIC PAIN OF LEFT KNEE: ICD-10-CM

## 2024-06-27 DIAGNOSIS — E66.01 MORBID OBESITY DUE TO EXCESS CALORIES (HCC): Primary | ICD-10-CM

## 2024-06-27 DIAGNOSIS — M25.562 CHRONIC PAIN OF LEFT KNEE: ICD-10-CM

## 2024-06-27 PROCEDURE — 99213 OFFICE O/P EST LOW 20 MIN: CPT | Performed by: INTERNAL MEDICINE

## 2024-06-27 RX ORDER — SEMAGLUTIDE 0.5 MG/.5ML
0.5 INJECTION, SOLUTION SUBCUTANEOUS
Qty: 2 ML | Refills: 0 | Status: SHIPPED | OUTPATIENT
Start: 2024-06-27

## 2024-06-27 SDOH — ECONOMIC STABILITY: FOOD INSECURITY: WITHIN THE PAST 12 MONTHS, YOU WORRIED THAT YOUR FOOD WOULD RUN OUT BEFORE YOU GOT MONEY TO BUY MORE.: NEVER TRUE

## 2024-06-27 SDOH — ECONOMIC STABILITY: FOOD INSECURITY: WITHIN THE PAST 12 MONTHS, THE FOOD YOU BOUGHT JUST DIDN'T LAST AND YOU DIDN'T HAVE MONEY TO GET MORE.: NEVER TRUE

## 2024-06-27 SDOH — ECONOMIC STABILITY: INCOME INSECURITY: HOW HARD IS IT FOR YOU TO PAY FOR THE VERY BASICS LIKE FOOD, HOUSING, MEDICAL CARE, AND HEATING?: NOT VERY HARD

## 2024-06-27 ASSESSMENT — PATIENT HEALTH QUESTIONNAIRE - PHQ9
SUM OF ALL RESPONSES TO PHQ QUESTIONS 1-9: 2
SUM OF ALL RESPONSES TO PHQ QUESTIONS 1-9: 2
3. TROUBLE FALLING OR STAYING ASLEEP: NOT AT ALL
4. FEELING TIRED OR HAVING LITTLE ENERGY: NOT AT ALL
SUM OF ALL RESPONSES TO PHQ9 QUESTIONS 1 & 2: 2
2. FEELING DOWN, DEPRESSED OR HOPELESS: SEVERAL DAYS
SUM OF ALL RESPONSES TO PHQ QUESTIONS 1-9: 2
10. IF YOU CHECKED OFF ANY PROBLEMS, HOW DIFFICULT HAVE THESE PROBLEMS MADE IT FOR YOU TO DO YOUR WORK, TAKE CARE OF THINGS AT HOME, OR GET ALONG WITH OTHER PEOPLE: NOT DIFFICULT AT ALL
1. LITTLE INTEREST OR PLEASURE IN DOING THINGS: SEVERAL DAYS
8. MOVING OR SPEAKING SO SLOWLY THAT OTHER PEOPLE COULD HAVE NOTICED. OR THE OPPOSITE, BEING SO FIGETY OR RESTLESS THAT YOU HAVE BEEN MOVING AROUND A LOT MORE THAN USUAL: NOT AT ALL
5. POOR APPETITE OR OVEREATING: NOT AT ALL
6. FEELING BAD ABOUT YOURSELF - OR THAT YOU ARE A FAILURE OR HAVE LET YOURSELF OR YOUR FAMILY DOWN: NOT AT ALL
SUM OF ALL RESPONSES TO PHQ QUESTIONS 1-9: 2
7. TROUBLE CONCENTRATING ON THINGS, SUCH AS READING THE NEWSPAPER OR WATCHING TELEVISION: NOT AT ALL
9. THOUGHTS THAT YOU WOULD BE BETTER OFF DEAD, OR OF HURTING YOURSELF: NOT AT ALL

## 2024-06-27 NOTE — PROGRESS NOTES
Ninoska Lehman  1985 06/27/24    SUBJECTIVE:    Pt is interested in meds for weight loss. She was on adipex which helped decrease her appetite, but she did not notice significant weight loss. Exercise has been limited.    She injured her knee in Feb - pain in the left posterolateral knee, improved with a brace. Sudden movement worsen the pain.     OBJECTIVE:    /80 (Site: Left Upper Arm, Position: Sitting, Cuff Size: Large Adult)   Pulse 75   Wt 120.8 kg (266 lb 6.4 oz)   LMP 06/23/2024   SpO2 95%   BMI 44.33 kg/m²     Physical Exam    ASSESSMENT:    1. Morbid obesity due to excess calories (HCC)    2. Chronic pain of left knee        PLAN:    Ninoska was seen today for discuss medications.    Diagnoses and all orders for this visit:    Morbid obesity due to excess calories (HCC) - will start wegovy. Side effects including biliary disease, pancreatitis, gastroparesis all discussed. Start low dose, and will f/u in 1 month to determine how she is responding.     Chronic pain of left knee - will refer to Dr Townsend  -     [facility] - Jamison Townsend DO, Orthopedic Surgery, [place]    Other orders  -     Semaglutide-Weight Management (WEGOVY) 0.5 MG/0.5ML SOAJ SC injection; Inject 0.5 mg into the skin every 7 days

## 2024-07-26 ENCOUNTER — OFFICE VISIT (OUTPATIENT)
Dept: INTERNAL MEDICINE CLINIC | Age: 39
End: 2024-07-26
Payer: COMMERCIAL

## 2024-07-26 VITALS
DIASTOLIC BLOOD PRESSURE: 72 MMHG | SYSTOLIC BLOOD PRESSURE: 104 MMHG | BODY MASS INDEX: 43.93 KG/M2 | OXYGEN SATURATION: 98 % | HEART RATE: 74 BPM | WEIGHT: 264 LBS | RESPIRATION RATE: 18 BRPM

## 2024-07-26 DIAGNOSIS — E66.01 MORBID OBESITY DUE TO EXCESS CALORIES (HCC): Primary | ICD-10-CM

## 2024-07-26 PROCEDURE — 99213 OFFICE O/P EST LOW 20 MIN: CPT | Performed by: INTERNAL MEDICINE

## 2024-07-26 RX ORDER — SEMAGLUTIDE 1 MG/.5ML
1 INJECTION, SOLUTION SUBCUTANEOUS
Qty: 2 ML | Refills: 0 | Status: SHIPPED | OUTPATIENT
Start: 2024-07-26

## 2024-07-26 NOTE — PROGRESS NOTES
Ninoska Lehman  1985 07/26/24    SUBJECTIVE:      Pt was started on wegovy last month.She is tolerating the med without difficulty. She feels that the med has lessened her appetite when she wakes.     OBJECTIVE:    /72   Pulse 74   Resp 18   Wt 119.7 kg (264 lb)   LMP 06/23/2024   SpO2 98%   BMI 43.93 kg/m²     Physical Exam    ASSESSMENT:    1. Morbid obesity due to excess calories (HCC)        PLAN:    Ninoska was seen today for 1 month follow-up.    Diagnoses and all orders for this visit:    Morbid obesity due to excess calories (HCC)  = pt tolerating the wegovy well. Increae to 1mg for 1 month, then will increase to 1.7. cont exercise and care with food choices.     Other orders  -     Semaglutide-Weight Management (WEGOVY) 1 MG/0.5ML SOAJ SC injection; Inject 1 mg into the skin every 7 days

## 2024-08-20 RX ORDER — SEMAGLUTIDE 1 MG/.5ML
1 INJECTION, SOLUTION SUBCUTANEOUS
Qty: 2 ML | Refills: 0 | OUTPATIENT
Start: 2024-08-20

## 2024-08-20 RX ORDER — SEMAGLUTIDE 1.7 MG/.75ML
1.7 INJECTION, SOLUTION SUBCUTANEOUS
Qty: 3 ML | Refills: 0 | Status: SHIPPED | OUTPATIENT
Start: 2024-08-20 | End: 2024-08-21 | Stop reason: SDUPTHER

## 2024-08-22 RX ORDER — SEMAGLUTIDE 1.7 MG/.75ML
1.7 INJECTION, SOLUTION SUBCUTANEOUS
Qty: 3 ML | Refills: 0 | Status: SHIPPED | OUTPATIENT
Start: 2024-08-22

## 2024-09-20 RX ORDER — SEMAGLUTIDE 2.4 MG/.75ML
2.4 INJECTION, SOLUTION SUBCUTANEOUS
Qty: 3 ML | Refills: 5 | Status: SHIPPED | OUTPATIENT
Start: 2024-09-20

## 2024-09-20 RX ORDER — SEMAGLUTIDE 1.7 MG/.75ML
1.7 INJECTION, SOLUTION SUBCUTANEOUS
Qty: 3 ML | Refills: 0 | OUTPATIENT
Start: 2024-09-20

## 2024-10-18 RX ORDER — SEMAGLUTIDE 2.4 MG/.75ML
2.4 INJECTION, SOLUTION SUBCUTANEOUS
Qty: 3 ML | Refills: 5 | OUTPATIENT
Start: 2024-10-18

## 2025-01-29 DIAGNOSIS — E66.01 MORBID OBESITY DUE TO EXCESS CALORIES: Primary | ICD-10-CM

## 2025-02-17 ENCOUNTER — OFFICE VISIT MH/BH (OUTPATIENT)
Dept: BARIATRICS/WEIGHT MGMT | Age: 40
End: 2025-02-17
Payer: COMMERCIAL

## 2025-02-17 VITALS
WEIGHT: 248.8 LBS | HEART RATE: 85 BPM | HEIGHT: 65 IN | DIASTOLIC BLOOD PRESSURE: 60 MMHG | SYSTOLIC BLOOD PRESSURE: 102 MMHG | BODY MASS INDEX: 41.45 KG/M2 | OXYGEN SATURATION: 97 %

## 2025-02-17 DIAGNOSIS — Z79.899 MEDICATION MANAGEMENT: ICD-10-CM

## 2025-02-17 DIAGNOSIS — E66.01 MORBID OBESITY WITH BMI OF 40.0-44.9, ADULT: Primary | ICD-10-CM

## 2025-02-17 LAB
ALCOHOL URINE: NORMAL
AMPHETAMINE SCREEN URINE: NORMAL
BARBITURATE SCREEN URINE: NEGATIVE
BENZODIAZEPINE SCREEN, URINE: NEGATIVE
BUPRENORPHINE URINE: NEGATIVE
COCAINE METABOLITE SCREEN URINE: NEGATIVE
FENTANYL SCREEN, URINE: NORMAL
GABAPENTIN SCREEN, URINE: NORMAL
MDMA, URINE: NEGATIVE
METHADONE SCREEN, URINE: NORMAL
METHAMPHETAMINE, URINE: NEGATIVE
OPIATE SCREEN URINE: NEGATIVE
OXYCODONE SCREEN URINE: NEGATIVE
PHENCYCLIDINE SCREEN URINE: NEGATIVE
PROPOXYPHENE SCREEN, URINE: NEGATIVE
SYNTHETIC CANNABINOIDS(K2) SCREEN, URINE: NORMAL
THC SCREEN, URINE: NEGATIVE
TRAMADOL SCREEN URINE: NORMAL
TRICYCLIC ANTIDEPRESSANTS, UR: NEGATIVE

## 2025-02-17 PROCEDURE — 99204 OFFICE O/P NEW MOD 45 MIN: CPT | Performed by: NURSE PRACTITIONER

## 2025-02-17 PROCEDURE — 80305 DRUG TEST PRSMV DIR OPT OBS: CPT | Performed by: NURSE PRACTITIONER

## 2025-02-17 NOTE — PROGRESS NOTES
Chief Complaint   Patient presents with    Weight Management     NP - 1st Med WM Visit    Bariatric, Initial Visit         SUBJECTIVE:    HPI: Patient is here with complaints of weight management.    Obesity with a BMI of Body mass index is 41.4 kg/m²..    Patient has failed to lose 5% of body weight for many years. Patient has tried Adipex, Topamax, Saxenda and Wegovy in the past for weight management. She has experienced weight regain with each medication once the therapy ended. She presents today to discuss weight management treatment options.    Any history of type 2 diabetes mellitus? Denies   If yes, any current GLP-1 receptor agonists or sulfonylureas? (must consider reducing dose of insulin or sulfonylureas by 1/2 to reduce risk of hypoglycemia).    Any current renal impairment? Denies    Any current hepatic impairment? Denies    Any history of pancreatitis? Denies    Any history of gastroparesis? Denies   (Saxenda has not been studied in patients with pre-existing gastroparesis).    Any history of personal or family medullary thyroid cancinoma (MTC) or multiple endocrine neoplasia type 2 (MEN 2)? Denies (black box warning).    Any current or history suicidal attempts or active suicidal ideation? Denies   (avoid in these patients)    Current weight: 248 pounds    Diet plan: denies a consistent diet plan    Exercise: limited    I have reviewed the patient's(pertinent information to this visit) medical history, family history(scanned in  the [employer] under \"patient questioner\"), social history and review of systems with the patient today in the office.          Past Surgical History:   Procedure Laterality Date    CHOLECYSTECTOMY, LAPAROSCOPIC  01/31/2012    WISDOM TOOTH EXTRACTION  2002    \"4\"       Past Medical History:   Diagnosis Date    CCC (chronic calculus cholecystitis) 1/31/2012    Depression     Hyperlipidemia     Recurrent cold sores     On Valtrex One Gram Daily For This       Family History

## 2025-02-18 LAB
ALBUMIN SERPL-MCNC: 4 G/DL (ref 3.4–5)
ALBUMIN/GLOB SERPL: 1.3 {RATIO} (ref 1.1–2.2)
ALP SERPL-CCNC: 93 U/L (ref 40–129)
ALT SERPL-CCNC: 48 U/L (ref 10–40)
ANION GAP SERPL CALCULATED.3IONS-SCNC: 10 MMOL/L (ref 3–16)
AST SERPL-CCNC: 38 U/L (ref 15–37)
BASOPHILS # BLD: 0 K/UL (ref 0–0.2)
BASOPHILS NFR BLD: 0 %
BILIRUB SERPL-MCNC: 0.4 MG/DL (ref 0–1)
BUN SERPL-MCNC: 11 MG/DL (ref 7–20)
CALCIUM SERPL-MCNC: 9 MG/DL (ref 8.3–10.6)
CHLORIDE SERPL-SCNC: 107 MMOL/L (ref 99–110)
CO2 SERPL-SCNC: 23 MMOL/L (ref 21–32)
CREAT SERPL-MCNC: 0.7 MG/DL (ref 0.6–1.1)
DEPRECATED RDW RBC AUTO: 14.5 % (ref 12.4–15.4)
EOSINOPHIL # BLD: 0.2 K/UL (ref 0–0.6)
EOSINOPHIL NFR BLD: 2 %
EST. AVERAGE GLUCOSE BLD GHB EST-MCNC: 102.5 MG/DL
GFR SERPLBLD CREATININE-BSD FMLA CKD-EPI: >90 ML/MIN/{1.73_M2}
GLUCOSE SERPL-MCNC: 79 MG/DL (ref 70–99)
HBA1C MFR BLD: 5.2 %
HCT VFR BLD AUTO: 43.9 % (ref 36–48)
HGB BLD-MCNC: 14.5 G/DL (ref 12–16)
LYMPHOCYTES # BLD: 4.3 K/UL (ref 1–5.1)
LYMPHOCYTES NFR BLD: 57 %
MCH RBC QN AUTO: 28.9 PG (ref 26–34)
MCHC RBC AUTO-ENTMCNC: 33 G/DL (ref 31–36)
MCV RBC AUTO: 87.5 FL (ref 80–100)
MONOCYTES # BLD: 0.5 K/UL (ref 0–1.3)
MONOCYTES NFR BLD: 7 %
NEUTROPHILS # BLD: 2.6 K/UL (ref 1.7–7.7)
NEUTROPHILS NFR BLD: 34 %
PLATELET # BLD AUTO: 343 K/UL (ref 135–450)
PLATELET BLD QL SMEAR: ADEQUATE
PMV BLD AUTO: 9 FL (ref 5–10.5)
POTASSIUM SERPL-SCNC: 4.3 MMOL/L (ref 3.5–5.1)
PROT SERPL-MCNC: 7 G/DL (ref 6.4–8.2)
RBC # BLD AUTO: 5.02 M/UL (ref 4–5.2)
SODIUM SERPL-SCNC: 140 MMOL/L (ref 136–145)
TSH SERPL DL<=0.005 MIU/L-ACNC: 2.12 UIU/ML (ref 0.27–4.2)
WBC # BLD AUTO: 7.5 K/UL (ref 4–11)

## 2025-02-27 ENCOUNTER — PATIENT MESSAGE (OUTPATIENT)
Dept: BARIATRICS/WEIGHT MGMT | Age: 40
End: 2025-02-27

## 2025-03-05 NOTE — TELEPHONE ENCOUNTER
Called pharmacy to check on medication status. Zepbound has not actually been prescribed yet, however the PA has been done for it so it can be sent into the pharmacy at pt's upcoming visit, pending results of work up.

## 2025-03-06 ENCOUNTER — OFFICE VISIT (OUTPATIENT)
Dept: BARIATRICS/WEIGHT MGMT | Age: 40
End: 2025-03-06
Payer: COMMERCIAL

## 2025-03-06 ENCOUNTER — OFFICE VISIT (OUTPATIENT)
Dept: BARIATRICS/WEIGHT MGMT | Age: 40
End: 2025-03-06

## 2025-03-06 VITALS
OXYGEN SATURATION: 97 % | SYSTOLIC BLOOD PRESSURE: 110 MMHG | HEART RATE: 80 BPM | DIASTOLIC BLOOD PRESSURE: 70 MMHG | HEIGHT: 65 IN | WEIGHT: 250.6 LBS | BODY MASS INDEX: 41.75 KG/M2

## 2025-03-06 VITALS — BODY MASS INDEX: 41.75 KG/M2 | WEIGHT: 250.6 LBS | HEIGHT: 65 IN

## 2025-03-06 DIAGNOSIS — E66.01 MORBID OBESITY WITH BMI OF 40.0-44.9, ADULT: Primary | ICD-10-CM

## 2025-03-06 DIAGNOSIS — Z79.899 MEDICATION MANAGEMENT: ICD-10-CM

## 2025-03-06 PROCEDURE — 99214 OFFICE O/P EST MOD 30 MIN: CPT | Performed by: NURSE PRACTITIONER

## 2025-03-06 RX ORDER — TIRZEPATIDE 2.5 MG/.5ML
2.5 INJECTION, SOLUTION SUBCUTANEOUS WEEKLY
Qty: 0.5 ML | Refills: 0 | Status: SHIPPED | OUTPATIENT
Start: 2025-03-06

## 2025-03-06 NOTE — PROGRESS NOTES
follow-up accordingly.    -Current birth control measures include: denies/ her  had vasectomy    - Call for any adverse reactions or concerns      No orders of the defined types were placed in this encounter.       Orders Placed This Encounter   Medications    tirzepatide-weight management (ZEPBOUND) 2.5 MG/0.5ML SOAJ subCUTAneous auto-injector pen     Sig: Inject 2.5 mg into the skin once a week     Dispense:  0.5 mL     Refill:  0        Follow Up:  Return in about 1 month (around 4/6/2025).      Vi Serra, APRN - CNP

## 2025-03-06 NOTE — PROGRESS NOTES
Outpatient Nutrition Counseling - Medication Weight Loss Program    REASON FOR VISIT: Medication Program    Chief Complaint:    Chief Complaint   Patient presents with    Weight Management       SUBJECTIVE:  The patient is a 40 y.o. female being seen for obesity, enrolled in Medication Weight Loss Program; Ninoska's, Height: 165.1 cm (5' 5\"), Weight - Scale: 113.7 kg (250 lb 9.6 oz), Current Body mass index is 41.7 kg/m².  patient's PCP is Chavo Stern MD     Comorbid Conditions:  Significant diseases affecting this patient are   Past Medical History:   Diagnosis Date    CCC (chronic calculus cholecystitis) 1/31/2012    Depression     Hyperlipidemia     Recurrent cold sores     On Valtrex One Gram Daily For This   .    Review of Systems - Review of Systems  Otherwise per HPI.    Allergies:  No Known Allergies    Past Surgical History:  Past Surgical History:   Procedure Laterality Date    CHOLECYSTECTOMY, LAPAROSCOPIC  01/31/2012    WISDOM TOOTH EXTRACTION  2002    \"4\"       Family History:  Family History   Problem Relation Age of Onset    Cancer Mother         \"Multiple Myleoma\"    Arthritis Mother     Depression Mother     High Cholesterol Mother     Miscarriages / Stillbirths Mother         X1    Stroke Mother         \"TIA'S\"    Mental Illness Mother         \"Dementia\"    Other Mother         \"Thyroid\", \"Glaucoma\"    Mental Illness Father         \"Dementia\"    Heart Disease Father         \"Open Heart For Valves\"    High Cholesterol Father     Other Father         \"Multiple Sclerosis\", \"Thyroid\", \"Glaucoma\"       Social History:  Social History     Socioeconomic History    Marital status:      Spouse name: Not on file    Number of children: 1    Years of education: bachelors degree     Highest education level: Not on file   Occupational History    Occupation:     Tobacco Use    Smoking status: Never    Smokeless tobacco: Never   Substance and Sexual Activity    Alcohol use: Yes

## 2025-03-12 ENCOUNTER — TELEPHONE (OUTPATIENT)
Dept: BARIATRICS/WEIGHT MGMT | Age: 40
End: 2025-03-12

## 2025-03-12 RX ORDER — SEMAGLUTIDE 0.25 MG/.5ML
0.25 INJECTION, SOLUTION SUBCUTANEOUS
Qty: 2 ML | Refills: 0 | Status: SHIPPED | OUTPATIENT
Start: 2025-03-12

## 2025-03-19 ENCOUNTER — TELEPHONE (OUTPATIENT)
Dept: BARIATRICS/WEIGHT MGMT | Age: 40
End: 2025-03-19

## 2025-03-19 NOTE — TELEPHONE ENCOUNTER
Spoke to patient at this time she will cancel appointments. She is looking around for other options and does not want surgery, adpiex, or qsymia at this time. She will call back if she wants to come back

## 2025-05-13 ENCOUNTER — OFFICE VISIT (OUTPATIENT)
Dept: BARIATRICS/WEIGHT MGMT | Age: 40
End: 2025-05-13
Payer: COMMERCIAL

## 2025-05-13 VITALS
HEART RATE: 72 BPM | WEIGHT: 261.5 LBS | SYSTOLIC BLOOD PRESSURE: 130 MMHG | OXYGEN SATURATION: 98 % | BODY MASS INDEX: 43.57 KG/M2 | HEIGHT: 65 IN | DIASTOLIC BLOOD PRESSURE: 78 MMHG | RESPIRATION RATE: 18 BRPM

## 2025-05-13 DIAGNOSIS — Z01.818 PRE-OPERATIVE CLEARANCE: ICD-10-CM

## 2025-05-13 DIAGNOSIS — Z79.899 MEDICATION MANAGEMENT: Primary | ICD-10-CM

## 2025-05-13 PROCEDURE — 99215 OFFICE O/P EST HI 40 MIN: CPT | Performed by: SURGERY

## 2025-05-13 ASSESSMENT — ENCOUNTER SYMPTOMS
GASTROINTESTINAL NEGATIVE: 1
RESPIRATORY NEGATIVE: 1
EYES NEGATIVE: 1

## 2025-05-13 NOTE — PROGRESS NOTES
Initial Bariatric Surgery Consultation History and Physical    Chief Complaint: Obesity Body mass index is 43.52 kg/m².    History of Present Illness:  The patient is a 40 y.o. female being seen today for initial bariatric surgery consultation.    The patient previously attended a bariatric surgery informational seminar and/or received electronic version of presentation.    The patient's PCP is Dr. Stern.    The patient first recognized having issues with increased weight approximately \"always\" years ago.     The patient identifies the following precipitants causing--or contributing to--weight gain: having one child, yo-yo for a while.    The patient estimates the lowest weight in the past five years is approximately 220 pounds.    The patient estimates the highest weight in the past five years is approximately 270 pounds.    The patient's current weight is 261 pounds, while the current BMI is Body mass index is 43.52 kg/m².    The patient has not had previous bariatric surgery.    The patient has tried the following diet(s): multiple - low fat, low calorie, Keto    The patient has tried the following over-the-counter drugs and/or prescription weight loss medications: Adipex, wegovy    The patient has tried the following physical activities or exercises: walking, exercise videos    The patient was mostly unsuccessful with previous dietary, medication, and/or physical activity for sustained weight loss.    The patient reports current level of commitment to weight loss as: 80 percent    In reviewing the patient's typical daily diet, it consists of the following:   Breakfast: vásquez, egg, and cheese, english muffin   Snack: cottage cheese and berries   Lunch: lunch meat sandwich, chips   Snack: denies   Dinner: chicken parmesan    Snack: popcorn    The patient reports drinking the following beverages throughout the day: coffee, diet coke, water    The patient reports eating outside of the house approximately 2 times

## 2025-05-22 ENCOUNTER — LAB (OUTPATIENT)
Dept: SURGERY | Age: 40
End: 2025-05-22
Payer: COMMERCIAL

## 2025-05-22 ENCOUNTER — OFFICE VISIT (OUTPATIENT)
Dept: BARIATRICS/WEIGHT MGMT | Age: 40
End: 2025-05-22

## 2025-05-22 VITALS
WEIGHT: 264.1 LBS | HEIGHT: 65 IN | BODY MASS INDEX: 44 KG/M2 | OXYGEN SATURATION: 95 % | SYSTOLIC BLOOD PRESSURE: 124 MMHG | DIASTOLIC BLOOD PRESSURE: 86 MMHG | HEART RATE: 79 BPM

## 2025-05-22 DIAGNOSIS — Z01.818 PRE-OPERATIVE CLEARANCE: ICD-10-CM

## 2025-05-22 DIAGNOSIS — Z79.899 MEDICATION MANAGEMENT: ICD-10-CM

## 2025-05-22 DIAGNOSIS — E66.01 MORBID OBESITY WITH BMI OF 40.0-44.9, ADULT (HCC): Primary | ICD-10-CM

## 2025-05-22 PROCEDURE — 36415 COLL VENOUS BLD VENIPUNCTURE: CPT | Performed by: SURGERY

## 2025-05-22 NOTE — PROGRESS NOTES
Nutrition Counseling    REASON FOR VISIT: Initial Surgical Class    Chief Complaint:    Chief Complaint   Patient presents with    Weight Management     Int Bar Class       SUBJECTIVE:  Patient here for initial nutrition class. Instructed on risks of obesity, surgical considerations, expected weight loss, program requirements, mindful eating, label reading, calorie counting, healthy food choices and goal setting. Patient signed goal card and verbalized understanding to all information provided. The patient is a 40 y.o. female being seen for morbid obesity, considering weight loss surgery; Ninoska's, Height: 165.1 cm (5' 5\"), Weight - Scale: 119.8 kg (264 lb 1.6 oz), Current Body mass index is 43.95 kg/m².   The patient's PCP is Chavo Stern MD     Comorbid Conditions:diseases affecting this patient are   Past Medical History:   Diagnosis Date    CCC (chronic calculus cholecystitis) 1/31/2012    Depression     Hyperlipidemia     Recurrent cold sores     On Valtrex One Gram Daily For This   .    Review of Systems - Review of Systems  Otherwise per HPI.    Allergies:  No Known Allergies    Past Surgical History:  Past Surgical History:   Procedure Laterality Date    CHOLECYSTECTOMY, LAPAROSCOPIC  01/31/2012    WISDOM TOOTH EXTRACTION  2002    \"4\"       Family History:  Family History   Problem Relation Age of Onset    Cancer Mother         \"Multiple Myleoma\"    Arthritis Mother     Depression Mother     High Cholesterol Mother     Miscarriages / Stillbirths Mother         X1    Stroke Mother         \"TIA'S\"    Mental Illness Mother         \"Dementia\"    Other Mother         \"Thyroid\", \"Glaucoma\"    Mental Illness Father         \"Dementia\"    Heart Disease Father         \"Open Heart For Valves\"    High Cholesterol Father     Other Father         \"Multiple Sclerosis\", \"Thyroid\", \"Glaucoma\"       Social History:  Social History     Socioeconomic History    Marital status:      Spouse name: Not on file

## 2025-05-23 LAB
25(OH)D3 SERPL-MCNC: 18.2 NG/ML
CHOLEST SERPL-MCNC: 210 MG/DL (ref 0–199)
EST. AVERAGE GLUCOSE BLD GHB EST-MCNC: 105.4 MG/DL
HBA1C MFR BLD: 5.3 %
HDLC SERPL-MCNC: 48 MG/DL (ref 40–60)
IRON SATN MFR SERPL: 24 % (ref 15–50)
IRON SERPL-MCNC: 62 UG/DL (ref 37–145)
LDLC SERPL CALC-MCNC: 138 MG/DL
MAGNESIUM SERPL-MCNC: 2.2 MG/DL (ref 1.8–2.4)
PTH-INTACT SERPL-MCNC: 55.6 PG/ML (ref 14–72)
REASON FOR REJECTION: NORMAL
REJECTED TEST: NORMAL
TIBC SERPL-MCNC: 256 UG/DL (ref 260–445)
TRIGL SERPL-MCNC: 121 MG/DL (ref 0–150)
VIT B12 SERPL-MCNC: 423 PG/ML (ref 211–911)
VLDLC SERPL CALC-MCNC: 24 MG/DL

## 2025-05-24 LAB — ZINC SERPL-MCNC: 67.8 UG/DL (ref 60–120)

## 2025-05-28 ENCOUNTER — TELEPHONE (OUTPATIENT)
Dept: BARIATRICS/WEIGHT MGMT | Age: 40
End: 2025-05-28

## 2025-05-30 ENCOUNTER — LAB (OUTPATIENT)
Dept: SURGERY | Age: 40
End: 2025-05-30
Payer: COMMERCIAL

## 2025-05-30 DIAGNOSIS — Z01.818 PREOPERATIVE CLEARANCE: ICD-10-CM

## 2025-05-30 DIAGNOSIS — Z79.899 MEDICATION MANAGEMENT: Primary | ICD-10-CM

## 2025-05-30 PROCEDURE — 36415 COLL VENOUS BLD VENIPUNCTURE: CPT | Performed by: SURGERY

## 2025-05-31 LAB — FOLATE SERPL-MCNC: 13.5 NG/ML (ref 4.78–24.2)

## 2025-06-23 ENCOUNTER — OFFICE VISIT (OUTPATIENT)
Dept: BARIATRICS/WEIGHT MGMT | Age: 40
End: 2025-06-23
Payer: COMMERCIAL

## 2025-06-23 VITALS
BODY MASS INDEX: 44.48 KG/M2 | WEIGHT: 267 LBS | HEIGHT: 65 IN | HEART RATE: 74 BPM | OXYGEN SATURATION: 95 % | SYSTOLIC BLOOD PRESSURE: 100 MMHG | DIASTOLIC BLOOD PRESSURE: 80 MMHG

## 2025-06-23 DIAGNOSIS — E78.00 ELEVATED CHOLESTEROL: ICD-10-CM

## 2025-06-23 DIAGNOSIS — Z79.899 MEDICATION MANAGEMENT: ICD-10-CM

## 2025-06-23 DIAGNOSIS — Z01.818 PRE-OP EVALUATION: ICD-10-CM

## 2025-06-23 DIAGNOSIS — E66.01 MORBID OBESITY WITH BMI OF 40.0-44.9, ADULT (HCC): Primary | ICD-10-CM

## 2025-06-23 DIAGNOSIS — E55.9 VITAMIN D DEFICIENCY: ICD-10-CM

## 2025-06-23 PROCEDURE — 99214 OFFICE O/P EST MOD 30 MIN: CPT | Performed by: NURSE PRACTITIONER

## 2025-06-23 RX ORDER — PHENTERMINE HYDROCHLORIDE 37.5 MG/1
37.5 TABLET ORAL
Qty: 30 TABLET | Refills: 0 | Status: SHIPPED | OUTPATIENT
Start: 2025-06-23 | End: 2025-07-23

## 2025-06-23 RX ORDER — CLOBETASOL PROPIONATE 0.5 MG/ML
SOLUTION TOPICAL 2 TIMES DAILY
COMMUNITY
Start: 2025-05-30

## 2025-06-23 RX ORDER — KETOCONAZOLE 20 MG/ML
SHAMPOO, SUSPENSION TOPICAL DAILY PRN
COMMUNITY
Start: 2025-05-30

## 2025-06-23 RX ORDER — CHOLECALCIFEROL (VITAMIN D3) 25 MCG
1000 TABLET ORAL DAILY
Qty: 30 TABLET | Refills: 0 | Status: SHIPPED | OUTPATIENT
Start: 2025-06-23

## 2025-06-23 NOTE — PROGRESS NOTES
BARIATRIC SURGERY OFFICE PROGRESS NOTE    SUBJECTIVE:    Patient presenting today referred from Chavo Stern MD, for   Chief Complaint   Patient presents with    Weight Management     2nd Surg WM Visit  -Card  -Pulm  -Psych      .    Vitals:    06/23/25 1534   BP: 100/80   Pulse: 74   SpO2: 95%        BMI: Body mass index is 44.43 kg/m².     Weight History:   Wt Readings from Last 3 Encounters:   06/23/25 121.1 kg (267 lb)   05/22/25 119.8 kg (264 lb 1.6 oz)   05/13/25 118.6 kg (261 lb 8 oz)         HPI:Ninoska Lehman is a 40 y.o. female presenting in second bariatric PRE-OP visit    Diet Recall:    Total weight loss/gain: +2.9 lbs since last visit, and +5.5 lbs since starting program     Protein: some calorie awareness  Water Intake: good  Exercise: denies  New health problems: reviewed with patient  New medications: reviewed with patient  Clearances:  -- Cardiology: referral sent   -- Pulmonology: referral sent   -- Psychology: referral sent    Thoroughly reviewed the patient's medical history, family history, social history and review of systems with the patient today in the office.  Please see medical record for pertinent positives.      Past Medical History:   Diagnosis Date    CCC (chronic calculus cholecystitis) 1/31/2012    Depression     Hyperlipidemia     Recurrent cold sores     On Valtrex One Gram Daily For This        Patient Active Problem List   Diagnosis    Cold sore    Hypercholesteremia    Morbid obesity due to excess calories (HCC)       Past Surgical History:   Procedure Laterality Date    CHOLECYSTECTOMY, LAPAROSCOPIC  01/31/2012    WISDOM TOOTH EXTRACTION  2002    \"4\"       Current Outpatient Medications   Medication Sig Dispense Refill    clobetasol (TEMOVATE) 0.05 % external solution Apply topically 2 times daily      ketoconazole (NIZORAL) 2 % shampoo Apply topically daily as needed for Itching      vitamin D3 (CHOLECALCIFEROL) 25 MCG (1000 UT) TABS tablet Take 1 tablet by

## 2025-07-10 ENCOUNTER — HOSPITAL ENCOUNTER (OUTPATIENT)
Dept: PHYSICAL THERAPY | Age: 40
Setting detail: THERAPIES SERIES
Discharge: HOME | End: 2025-07-10
Attending: SURGERY
Payer: COMMERCIAL

## 2025-07-10 PROCEDURE — 97161 PT EVAL LOW COMPLEX 20 MIN: CPT

## 2025-07-10 NOTE — PLAN OF CARE
Outpatient Physical Therapy           [place]           [] Phone: [phone]   Fax: [phone]  [place]           [] Phone: [phone]   Fax: [phone]     To: Wes Rudd II, MD Dr. Carman   From: Queenie Villalobos, PT, PT     Patient: Ninoska Lehman       : 1985  Diagnosis: Medication management [Z79.899]  Pre-operative clearance [Z01.818] Diagnosis: alethea  Treatment Diagnosis: alethea  Date: 7/10/2025    Physical Therapy Certification/Re-Certification Form  Dear Dr. Rudd,   The following patient has been evaluated for physical therapy services and for therapy to continue, insurance requires physician review of the treatment plan initially and every 90 days. Please review the attached evaluation and/or summary of the patient's plan of care, and verify that you agree therapy should continue by signing the attached document and sending it back to our office.    Assessment:    Assessment: Pt is 40 year old female currently enrolled in weight management program. Pt now has difficulties completing prolonged activities including standing and walking due to fatigue and progressive pain. Pt demo deficits this date that include deconditioning and left knee pain intermittent with continued activity. Pt will benefit with PT services with return for education on physical activity to safely increase activity to prevent injury and safe weight loss. Patient agrees with established plan of care and assisted in the development of their short term and long term goals. Patient had no adverse reaction with initial treatment and there are no barriers to learning. Demonstrates no mental or cognitive disorder    Plan of Care/Treatment to date:  [x] Therapeutic Exercise  [] Modalities:  [x] Therapeutic Activity     [] Ultrasound  [] Electrical Stimulation  [] Gait Training      [] Cervical Traction [] Lumbar Traction  [] Neuromuscular Re-education    [] Cold/hotpack [] Iontophoresis   [x] Instruction in HEP

## 2025-07-10 NOTE — FLOWSHEET NOTE
Outpatient Physical Therapy  [place]           [x] Phone: [phone]   Fax: [phone]  [place]           [] Phone: [phone]   Fax: [phone]        Physical Therapy Daily Treatment Note  Date:  7/10/2025    Patient Name:  Ninoska Lehman    :  1985  MRN: [id number]  Restrictions/Precautions: NONE  Diagnosis:   Medication management [Z79.899]  Pre-operative clearance [Z01.818] Diagnosis: alethea  Date of Injury/Surgery: --  Treatment Diagnosis:  [facility]  Insurance/Certification information: [employer]  Referring Physician:  Wes Rudd II, MD Dr. Carman   PCP: Chavo Stern MD  Next Doctor Visit:  --  Plan of care signed (Y/N):  N, sent 7/10/25  Outcome Measure: --  Visit# / total visits:   1/2  Pain level: 0/10   Goals:     Patient goals: weight loss  Short term goals  Time Frame for Short term goals: 2 weeks  Pt will return for bariatric wellness program    Summary of Evaluation:  Assessment: Pt is 40 year old female currently enrolled in weight management program. Pt now has difficulties completing prolonged activities including standing and walking due to fatigue and progressive pain. Pt demo deficits this date that include deconditioning and left knee pain intermittent with continued activity. Pt will benefit with PT services with return for education on physical activity to safely increase activity to prevent injury and safe weight loss. Patient agrees with established plan of care and assisted in the development of their short term and long term goals. Patient had no adverse reaction with initial treatment and there are no barriers to learning. Demonstrates no mental or cognitive disorder        Subjective:  See eval         Any changes in Ambulatory Summary Sheet?  None        Objective:  See eval           Exercises: (No more than 4 columns)   Exercise/Equipment 7/10/25 #1 Date Date           WARM UP                     TABLE                                       STANDING

## 2025-07-10 NOTE — PROGRESS NOTES
None  Eyes Closed: 30 seconds  Sway: None    ROM:Cervical: WNL all directions, no pain  Lumbar: Flexion: able to reach tops of toes, no pain; Extension: 25% restriction; Rotation: able to clear ASIS, no pain    Left Strength  Right Strength      Strength Other  Other: Heel raises: x10 DL without difficulties or limtiations, x10 SL reduced height, no pain  Other: Heel raises: x10 DL without difficulties or limtiations, x10 SL reduced height, no pain  Strength LUE  Strength LUE: WFL  Strength LLE  Strength LLE: WFL Strength Other  Other: Heel raises: x10 DL without difficulties or limtiations, x10 SL reduced height, no pain  Other: Heel raises: x10 DL without difficulties or limtiations, x10 SL reduced height, no pain  Strength RUE  Strength RUE: WFL  Strength RLE  Strength RLE: WFL     Additional Spinal Strength (if applicable): Other: Heel raises: x10 DL without difficulties or limtiations, x10 SL reduced height, no pain    Additional Finding(s) (if applicable): Additional Measures  Special Tests: 6MWT: 96% o2 sat, 104 bpm (before), 1,397 ft 98% O2 sat, 122 bpm  Other: 30 sec STS: 12 times without UE assist, no increased pain    ASSESSMENT     Impression:Assessment: Pt is 40 year old female currently enrolled in weight management program. Pt now has difficulties completing prolonged activities including standing and walking due to fatigue and progressive pain. Pt demo deficits this date that include deconditioning and left knee pain intermittent with continued activity. Pt will benefit with PT services with return for education on physical activity to safely increase activity to prevent injury and safe weight loss. Patient agrees with established plan of care and assisted in the development of their short term and long term goals. Patient had no adverse reaction with initial treatment and there are no barriers to learning. Demonstrates no mental or cognitive disorder   Body Structures, Functions, Activity

## 2025-07-16 ENCOUNTER — OFFICE VISIT (OUTPATIENT)
Age: 40
End: 2025-07-16
Payer: COMMERCIAL

## 2025-07-16 ENCOUNTER — OFFICE VISIT (OUTPATIENT)
Dept: BARIATRICS/WEIGHT MGMT | Age: 40
End: 2025-07-16

## 2025-07-16 ENCOUNTER — HOSPITAL ENCOUNTER (OUTPATIENT)
Age: 40
Setting detail: SPECIMEN
Discharge: HOME | End: 2025-07-16
Payer: COMMERCIAL

## 2025-07-16 VITALS
HEIGHT: 65 IN | WEIGHT: 261.2 LBS | SYSTOLIC BLOOD PRESSURE: 112 MMHG | HEART RATE: 78 BPM | BODY MASS INDEX: 43.52 KG/M2 | DIASTOLIC BLOOD PRESSURE: 83 MMHG

## 2025-07-16 VITALS — WEIGHT: 261.7 LBS | HEIGHT: 65 IN | BODY MASS INDEX: 43.6 KG/M2

## 2025-07-16 DIAGNOSIS — E66.01 MORBID OBESITY WITH BMI OF 40.0-44.9, ADULT (HCC): Primary | ICD-10-CM

## 2025-07-16 DIAGNOSIS — Z01.419 WOMEN'S ANNUAL ROUTINE GYNECOLOGICAL EXAMINATION: Primary | ICD-10-CM

## 2025-07-16 PROCEDURE — G0123 SCREEN CERV/VAG THIN LAYER: HCPCS

## 2025-07-16 PROCEDURE — 99396 PREV VISIT EST AGE 40-64: CPT

## 2025-07-16 SDOH — ECONOMIC STABILITY: FOOD INSECURITY: WITHIN THE PAST 12 MONTHS, THE FOOD YOU BOUGHT JUST DIDN'T LAST AND YOU DIDN'T HAVE MONEY TO GET MORE.: NEVER TRUE

## 2025-07-16 SDOH — ECONOMIC STABILITY: FOOD INSECURITY: WITHIN THE PAST 12 MONTHS, YOU WORRIED THAT YOUR FOOD WOULD RUN OUT BEFORE YOU GOT MONEY TO BUY MORE.: NEVER TRUE

## 2025-07-16 ASSESSMENT — PATIENT HEALTH QUESTIONNAIRE - PHQ9
6. FEELING BAD ABOUT YOURSELF - OR THAT YOU ARE A FAILURE OR HAVE LET YOURSELF OR YOUR FAMILY DOWN: NOT AT ALL
5. POOR APPETITE OR OVEREATING: NOT AT ALL
SUM OF ALL RESPONSES TO PHQ QUESTIONS 1-9: 0
4. FEELING TIRED OR HAVING LITTLE ENERGY: NOT AT ALL
10. IF YOU CHECKED OFF ANY PROBLEMS, HOW DIFFICULT HAVE THESE PROBLEMS MADE IT FOR YOU TO DO YOUR WORK, TAKE CARE OF THINGS AT HOME, OR GET ALONG WITH OTHER PEOPLE: NOT DIFFICULT AT ALL
SUM OF ALL RESPONSES TO PHQ QUESTIONS 1-9: 0
1. LITTLE INTEREST OR PLEASURE IN DOING THINGS: NOT AT ALL
3. TROUBLE FALLING OR STAYING ASLEEP: NOT AT ALL
8. MOVING OR SPEAKING SO SLOWLY THAT OTHER PEOPLE COULD HAVE NOTICED. OR THE OPPOSITE, BEING SO FIGETY OR RESTLESS THAT YOU HAVE BEEN MOVING AROUND A LOT MORE THAN USUAL: NOT AT ALL
9. THOUGHTS THAT YOU WOULD BE BETTER OFF DEAD, OR OF HURTING YOURSELF: NOT AT ALL
2. FEELING DOWN, DEPRESSED OR HOPELESS: NOT AT ALL
7. TROUBLE CONCENTRATING ON THINGS, SUCH AS READING THE NEWSPAPER OR WATCHING TELEVISION: NOT AT ALL

## 2025-07-16 NOTE — PROGRESS NOTES
Date: 2025   Name: Ninoska Lehman  YOB: 1985    Chief Complaint   Patient presents with    Annual Exam     Annual exam. Non-smoker No known h/o dvt. Patient Patient's last menstrual period was 2025..Periods are regular.Patient is sexually active. Patient is not on birth control. Pap Smear was  24 and results were negativeHPV negative. Patient had a recent mammogram 24 which was negative for malignancy. Patient has no complaints          The patient is a 40 y.o. female,  who presents for her annual exam.  Menses once monthly and regular. No concerns.     Past Medical History:   Diagnosis Date    CCC (chronic calculus cholecystitis) 2012    Depression     Hyperlipidemia     Recurrent cold sores     On Valtrex One Gram Daily For This     Past Surgical History:   Procedure Laterality Date    BREAST SURGERY  2015    breast lift    CHOLECYSTECTOMY, LAPAROSCOPIC  2012    LEEP N/A 2022    recurrent HUGH 1 with HPV; pathology HUGH 2    WISDOM TOOTH EXTRACTION  2002    \"4\"     Family History   Problem Relation Age of Onset    Cancer Mother         \"Multiple Myleoma\"    Arthritis Mother     Depression Mother     High Cholesterol Mother     Miscarriages / Stillbirths Mother         X1    Stroke Mother         \"TIA'S\"    Mental Illness Mother         \"Dementia\"    Other Mother         \"Thyroid\", \"Glaucoma\"    Dementia Mother     Dementia Father     Mental Illness Father         \"Dementia\"    Heart Disease Father         \"Open Heart For Valves\"    High Cholesterol Father     Other Father         \"Multiple Sclerosis\", \"Thyroid\", \"Glaucoma\"    Cancer Paternal Grandmother      Social History     Tobacco Use    Smoking status: Never    Smokeless tobacco: Never   Substance Use Topics    Alcohol use: Yes     Alcohol/week: 1.0 standard drink of alcohol     Types: 1 Standard drinks or equivalent per week     Comment: \"Socially\"    Drug use: No     Current Outpatient

## 2025-07-16 NOTE — PROGRESS NOTES
Nutrition Counseling    REASON FOR VISIT: Pre Op F/U    Chief Complaint:    Chief Complaint   Patient presents with    Weight Management       SUBJECTIVE:  Pt here for pre op f/u in surgical weight management program. The patient is a 40 y.o. female being seen for morbid obesity, considering weight loss surgery; Ninoska's, Height: 165.1 cm (5' 5\"), Weight - Scale: 118.7 kg (261 lb 11.2 oz), Current Body mass index is 43.55 kg/m².   The patient's PCP is Chavo Stern MD     Comorbid Conditions:diseases affecting this patient are   Past Medical History:   Diagnosis Date    CCC (chronic calculus cholecystitis) 1/31/2012    Depression     Hyperlipidemia     Recurrent cold sores     On Valtrex One Gram Daily For This   .    Review of Systems - Review of Systems  Otherwise per HPI.    Allergies:  No Known Allergies    Past Surgical History:  Past Surgical History:   Procedure Laterality Date    BREAST SURGERY  08/2015    breast lift    CHOLECYSTECTOMY, LAPAROSCOPIC  01/31/2012    LEEP N/A 11/02/2022    recurrent HUGH 1 with HPV; pathology HUGH 2    WISDOM TOOTH EXTRACTION  01/01/2002    \"4\"       Family History:  Family History   Problem Relation Age of Onset    Cancer Mother         \"Multiple Myleoma\"    Arthritis Mother     Depression Mother     High Cholesterol Mother     Miscarriages / Stillbirths Mother         X1    Stroke Mother         \"TIA'S\"    Mental Illness Mother         \"Dementia\"    Other Mother         \"Thyroid\", \"Glaucoma\"    Dementia Mother     Dementia Father     Mental Illness Father         \"Dementia\"    Heart Disease Father         \"Open Heart For Valves\"    High Cholesterol Father     Other Father         \"Multiple Sclerosis\", \"Thyroid\", \"Glaucoma\"    Cancer Paternal Grandmother        Social History:  Social History     Socioeconomic History    Marital status:      Spouse name: Not on file    Number of children: 1    Years of education: bachelors degree     Highest education level:

## 2025-07-23 ENCOUNTER — OFFICE VISIT (OUTPATIENT)
Dept: BARIATRICS/WEIGHT MGMT | Age: 40
End: 2025-07-23
Payer: COMMERCIAL

## 2025-07-23 VITALS
SYSTOLIC BLOOD PRESSURE: 106 MMHG | OXYGEN SATURATION: 97 % | HEIGHT: 65 IN | BODY MASS INDEX: 43.07 KG/M2 | DIASTOLIC BLOOD PRESSURE: 78 MMHG | HEART RATE: 87 BPM | WEIGHT: 258.5 LBS

## 2025-07-23 DIAGNOSIS — E78.00 ELEVATED CHOLESTEROL: ICD-10-CM

## 2025-07-23 DIAGNOSIS — Z79.899 MEDICATION MANAGEMENT: ICD-10-CM

## 2025-07-23 DIAGNOSIS — E66.01 MORBID OBESITY WITH BMI OF 40.0-44.9, ADULT (HCC): Primary | ICD-10-CM

## 2025-07-23 DIAGNOSIS — Z01.818 PRE-OP EVALUATION: ICD-10-CM

## 2025-07-23 LAB
COMMENT: NORMAL
HPV OTHER HR TYPES: NOT DETECTED
HPV TYPE 16: NOT DETECTED
HPV TYPE 18: NOT DETECTED

## 2025-07-23 PROCEDURE — 99214 OFFICE O/P EST MOD 30 MIN: CPT | Performed by: NURSE PRACTITIONER

## 2025-07-23 RX ORDER — PHENTERMINE HYDROCHLORIDE 37.5 MG/1
37.5 TABLET ORAL
Qty: 30 TABLET | Refills: 0 | Status: SHIPPED | OUTPATIENT
Start: 2025-07-23 | End: 2025-08-22

## 2025-07-23 NOTE — PROGRESS NOTES
30 tablet     Refill:  0     No orders of the defined types were placed in this encounter.      Follow Up:  Return in about 1 month (around 8/23/2025).    NITISH Vilchis - CNP

## 2025-07-24 LAB — GYNECOLOGY CYTOLOGY REPORT: NORMAL

## 2025-08-05 ENCOUNTER — INITIAL CONSULT (OUTPATIENT)
Dept: CARDIOLOGY CLINIC | Age: 40
End: 2025-08-05
Payer: COMMERCIAL

## 2025-08-05 ENCOUNTER — TELEPHONE (OUTPATIENT)
Dept: CARDIOLOGY CLINIC | Age: 40
End: 2025-08-05

## 2025-08-05 VITALS
SYSTOLIC BLOOD PRESSURE: 120 MMHG | HEART RATE: 73 BPM | WEIGHT: 260.4 LBS | BODY MASS INDEX: 43.33 KG/M2 | DIASTOLIC BLOOD PRESSURE: 68 MMHG

## 2025-08-05 DIAGNOSIS — Z01.818 PRE-OP EXAMINATION: Primary | ICD-10-CM

## 2025-08-05 DIAGNOSIS — Z01.818 PRE-OP EVALUATION: ICD-10-CM

## 2025-08-05 DIAGNOSIS — R06.02 SHORTNESS OF BREATH: ICD-10-CM

## 2025-08-05 DIAGNOSIS — E78.00 HYPERCHOLESTEREMIA: ICD-10-CM

## 2025-08-05 DIAGNOSIS — E66.01 MORBID OBESITY DUE TO EXCESS CALORIES (HCC): ICD-10-CM

## 2025-08-05 PROCEDURE — 99204 OFFICE O/P NEW MOD 45 MIN: CPT | Performed by: INTERNAL MEDICINE

## 2025-08-05 PROCEDURE — 93000 ELECTROCARDIOGRAM COMPLETE: CPT | Performed by: INTERNAL MEDICINE

## 2025-08-08 ENCOUNTER — HOSPITAL ENCOUNTER (OUTPATIENT)
Dept: GENERAL RADIOLOGY | Age: 40
Discharge: HOME | End: 2025-08-08
Payer: COMMERCIAL

## 2025-08-08 DIAGNOSIS — Z01.818 PRE-OP EVALUATION: ICD-10-CM

## 2025-08-08 PROCEDURE — 71046 X-RAY EXAM CHEST 2 VIEWS: CPT

## 2025-08-19 ENCOUNTER — HOSPITAL ENCOUNTER (OUTPATIENT)
Age: 40
Setting detail: SPECIMEN
Discharge: HOME | End: 2025-08-19
Payer: COMMERCIAL

## 2025-08-19 ENCOUNTER — PROCEDURE VISIT (OUTPATIENT)
Age: 40
End: 2025-08-19

## 2025-08-19 ENCOUNTER — OFFICE VISIT (OUTPATIENT)
Dept: BARIATRICS/WEIGHT MGMT | Age: 40
End: 2025-08-19
Payer: COMMERCIAL

## 2025-08-19 VITALS
WEIGHT: 258.5 LBS | HEART RATE: 86 BPM | BODY MASS INDEX: 43.07 KG/M2 | HEIGHT: 65 IN | SYSTOLIC BLOOD PRESSURE: 114 MMHG | DIASTOLIC BLOOD PRESSURE: 78 MMHG | OXYGEN SATURATION: 97 %

## 2025-08-19 DIAGNOSIS — E66.01 MORBID OBESITY WITH BMI OF 40.0-44.9, ADULT (HCC): Primary | ICD-10-CM

## 2025-08-19 DIAGNOSIS — R87.611 ATYPICAL SQUAMOUS CELLS CANNOT EXCLUDE HIGH GRADE SQUAMOUS INTRAEPITHELIAL LESION ON CYTOLOGIC SMEAR OF CERVIX (ASC-H): Primary | ICD-10-CM

## 2025-08-19 DIAGNOSIS — Z01.818 PRE-OP EVALUATION: ICD-10-CM

## 2025-08-19 DIAGNOSIS — Z79.899 MEDICATION MANAGEMENT: ICD-10-CM

## 2025-08-19 DIAGNOSIS — E78.00 ELEVATED CHOLESTEROL: ICD-10-CM

## 2025-08-19 PROCEDURE — 99214 OFFICE O/P EST MOD 30 MIN: CPT | Performed by: NURSE PRACTITIONER

## 2025-08-19 PROCEDURE — 88305 TISSUE EXAM BY PATHOLOGIST: CPT | Performed by: PATHOLOGY

## 2025-08-19 RX ORDER — PHENTERMINE HYDROCHLORIDE 37.5 MG/1
37.5 TABLET ORAL
Qty: 30 TABLET | Refills: 0 | Status: SHIPPED | OUTPATIENT
Start: 2025-08-19 | End: 2025-09-18

## 2025-08-22 LAB — SURGICAL PATHOLOGY REPORT: NORMAL

## 2025-08-27 ENCOUNTER — HOSPITAL ENCOUNTER (OUTPATIENT)
Dept: PHYSICAL THERAPY | Age: 40
Setting detail: THERAPIES SERIES
Discharge: HOME | End: 2025-08-27
Attending: SURGERY
Payer: COMMERCIAL

## 2025-08-27 PROCEDURE — 97150 GROUP THERAPEUTIC PROCEDURES: CPT
